# Patient Record
Sex: MALE | Race: WHITE | NOT HISPANIC OR LATINO | Employment: FULL TIME | URBAN - METROPOLITAN AREA
[De-identification: names, ages, dates, MRNs, and addresses within clinical notes are randomized per-mention and may not be internally consistent; named-entity substitution may affect disease eponyms.]

---

## 2023-05-24 ENCOUNTER — OFFICE VISIT (OUTPATIENT)
Dept: PRIMARY CARE | Facility: CLINIC | Age: 53
End: 2023-05-24
Payer: COMMERCIAL

## 2023-05-24 VITALS
BODY MASS INDEX: 29.13 KG/M2 | DIASTOLIC BLOOD PRESSURE: 74 MMHG | HEIGHT: 74 IN | SYSTOLIC BLOOD PRESSURE: 128 MMHG | WEIGHT: 227 LBS

## 2023-05-24 DIAGNOSIS — R53.83 OTHER FATIGUE: Primary | ICD-10-CM

## 2023-05-24 DIAGNOSIS — L91.8 SKIN TAG: ICD-10-CM

## 2023-05-24 DIAGNOSIS — M25.512 LEFT SHOULDER PAIN, UNSPECIFIED CHRONICITY: ICD-10-CM

## 2023-05-24 PROBLEM — R79.89 ELEVATED SERUM CREATININE: Status: ACTIVE | Noted: 2023-05-24

## 2023-05-24 PROBLEM — M75.20 BICEPS TENDONITIS: Status: ACTIVE | Noted: 2023-05-24

## 2023-05-24 PROBLEM — J32.9 SINUSITIS: Status: ACTIVE | Noted: 2023-05-24

## 2023-05-24 PROBLEM — E78.5 DYSLIPIDEMIA: Status: ACTIVE | Noted: 2023-05-24

## 2023-05-24 PROBLEM — R63.1 INCREASED THIRST: Status: ACTIVE | Noted: 2023-05-24

## 2023-05-24 PROBLEM — R19.00 ABDOMINAL LUMP: Status: ACTIVE | Noted: 2023-05-24

## 2023-05-24 PROBLEM — J40 BRONCHITIS: Status: ACTIVE | Noted: 2023-05-24

## 2023-05-24 PROBLEM — J01.90 ACUTE SINUSITIS: Status: ACTIVE | Noted: 2023-05-24

## 2023-05-24 PROBLEM — M25.511 ARTHRALGIA OF RIGHT ACROMIOCLAVICULAR JOINT: Status: ACTIVE | Noted: 2023-05-24

## 2023-05-24 PROBLEM — R06.02 SOB (SHORTNESS OF BREATH): Status: ACTIVE | Noted: 2023-05-24

## 2023-05-24 PROBLEM — R74.8 LIVER ENZYME ELEVATION: Status: ACTIVE | Noted: 2023-05-24

## 2023-05-24 PROBLEM — E78.5 HYPERLIPIDEMIA: Status: ACTIVE | Noted: 2023-05-24

## 2023-05-24 PROBLEM — R73.09 INCREASED GLUCOSE LEVEL: Status: ACTIVE | Noted: 2023-05-24

## 2023-05-24 PROBLEM — H10.30 ACUTE BACTERIAL CONJUNCTIVITIS: Status: ACTIVE | Noted: 2023-05-24

## 2023-05-24 PROBLEM — U07.1 COVID-19: Status: ACTIVE | Noted: 2023-05-24

## 2023-05-24 PROBLEM — R51.9 HEADACHE: Status: ACTIVE | Noted: 2023-05-24

## 2023-05-24 PROBLEM — R35.0 URINARY FREQUENCY: Status: ACTIVE | Noted: 2023-05-24

## 2023-05-24 PROBLEM — G47.00 INSOMNIA: Status: ACTIVE | Noted: 2023-05-24

## 2023-05-24 PROBLEM — G47.33 OBSTRUCTIVE SLEEP APNEA: Status: ACTIVE | Noted: 2023-05-24

## 2023-05-24 PROBLEM — M25.519 SHOULDER PAIN: Status: ACTIVE | Noted: 2023-05-24

## 2023-05-24 PROBLEM — J30.2 SEASONAL ALLERGIES: Status: ACTIVE | Noted: 2023-05-24

## 2023-05-24 PROBLEM — M77.10 TENNIS ELBOW: Status: ACTIVE | Noted: 2023-05-24

## 2023-05-24 PROBLEM — R10.9 ABDOMINAL PAIN: Status: ACTIVE | Noted: 2023-05-24

## 2023-05-24 PROBLEM — H57.9 ITCHY EYES: Status: ACTIVE | Noted: 2023-05-24

## 2023-05-24 PROBLEM — R06.83 SNORING: Status: ACTIVE | Noted: 2023-05-24

## 2023-05-24 PROCEDURE — 99213 OFFICE O/P EST LOW 20 MIN: CPT | Performed by: INTERNAL MEDICINE

## 2023-05-24 PROCEDURE — 1036F TOBACCO NON-USER: CPT | Performed by: INTERNAL MEDICINE

## 2023-06-08 ENCOUNTER — TELEPHONE (OUTPATIENT)
Dept: PRIMARY CARE | Facility: CLINIC | Age: 53
End: 2023-06-08
Payer: COMMERCIAL

## 2023-06-15 NOTE — PROGRESS NOTES
"Subjective   Patient ID: Justus Arredondo is a 53 y.o. male who presents for Follow-up.    HPI   Patient presents with complaints of having left shoulder pain in December he fell on the left side in the bathroom.  He did therapy felt better but now having issues again.  He started going to the gym and doing workouts  Wants skin tags removed from the armpits  Patient feels very tired and fatigued concern if he has low testosterone      for physical  Patient has a sedentary job  He does weightlifting 3 times a week exercising more  eating better  lost weight     Past medical history: High cholesterol, elevated liver function, hypertension  Social history: Nonsmoker, drinks 2 drinks per week  Family history: Grandmother diabetes uncle CVA, pGM CVA      Review of Systems    Objective   /74   Ht 1.88 m (6' 2\")   Wt 103 kg (227 lb)   BMI 29.15 kg/m²     Physical Exam  Vitals reviewed.   Constitutional:       Appearance: Normal appearance.   HENT:      Head: Normocephalic and atraumatic.      Right Ear: Tympanic membrane, ear canal and external ear normal.      Left Ear: Tympanic membrane, ear canal and external ear normal.      Nose: Nose normal.      Mouth/Throat:      Pharynx: Oropharynx is clear.   Eyes:      Extraocular Movements: Extraocular movements intact.      Conjunctiva/sclera: Conjunctivae normal.      Pupils: Pupils are equal, round, and reactive to light.   Cardiovascular:      Rate and Rhythm: Normal rate and regular rhythm.      Pulses: Normal pulses.      Heart sounds: Normal heart sounds.   Pulmonary:      Effort: Pulmonary effort is normal.      Breath sounds: Normal breath sounds.   Abdominal:      General: Abdomen is flat. Bowel sounds are normal.      Palpations: Abdomen is soft.   Musculoskeletal:      Cervical back: Normal range of motion and neck supple.   Skin:     General: Skin is warm and dry.      Comments: Skin tags   Neurological:      General: No focal deficit present.      " Mental Status: He is alert and oriented to person, place, and time.   Psychiatric:         Mood and Affect: Mood normal.         Assessment/Plan   Problem List Items Addressed This Visit          Musculoskeletal    Shoulder pain    Relevant Orders    XR shoulder left 2+ views    MR shoulder left wo IV contrast       Other    Fatigue - Primary    Relevant Orders    Testosterone, total and free    Skin tag    Relevant Orders    Referral to Dermatology     Past recap   physical normal  EKG normal sinus rhythm  Blood work reviewed  Kidney functions are doing better  Liver functions are going up again  Cholesterol is coming down but HDL is going up , TG normal  Continue diet and exercise  Patient does not want to go on medication  Advised to increase activity  Discussed dietary changes  Follow-up in 1 year  Screening colonoscopy    5/24/2023  Left shoulder has slightly restricted range of motion  Worried about it is a capsulitis  MRI of the shoulder ordered  Refer to dermatology for skin tag  Testosterone levels ordered to rule out cause for fatigue

## 2023-06-26 ENCOUNTER — LAB (OUTPATIENT)
Dept: LAB | Facility: LAB | Age: 53
End: 2023-06-26
Payer: COMMERCIAL

## 2023-06-26 DIAGNOSIS — R53.83 OTHER FATIGUE: ICD-10-CM

## 2023-06-26 PROCEDURE — 36415 COLL VENOUS BLD VENIPUNCTURE: CPT

## 2023-06-26 PROCEDURE — 84402 ASSAY OF FREE TESTOSTERONE: CPT

## 2023-06-26 PROCEDURE — 84403 ASSAY OF TOTAL TESTOSTERONE: CPT

## 2023-07-03 LAB
TESTOSTERONE FREE (CHAN): 78.7 PG/ML (ref 35–155)
TESTOSTERONE,TOTAL,LC-MS/MS: 480 NG/DL (ref 250–1100)

## 2023-07-07 DIAGNOSIS — M25.519 SHOULDER PAIN, UNSPECIFIED CHRONICITY, UNSPECIFIED LATERALITY: ICD-10-CM

## 2023-10-11 ENCOUNTER — APPOINTMENT (OUTPATIENT)
Dept: DERMATOLOGY | Facility: CLINIC | Age: 53
End: 2023-10-11
Payer: COMMERCIAL

## 2023-10-11 ENCOUNTER — OFFICE VISIT (OUTPATIENT)
Dept: DERMATOLOGY | Facility: CLINIC | Age: 53
End: 2023-10-11

## 2023-10-11 DIAGNOSIS — L28.0 LICHEN SIMPLEX CHRONICUS: Primary | ICD-10-CM

## 2023-10-11 DIAGNOSIS — L91.8 SKIN TAG: ICD-10-CM

## 2023-10-11 DIAGNOSIS — D18.01 ANGIOMA OF SKIN: ICD-10-CM

## 2023-10-11 PROCEDURE — 1036F TOBACCO NON-USER: CPT | Performed by: NURSE PRACTITIONER

## 2023-10-11 PROCEDURE — 99203 OFFICE O/P NEW LOW 30 MIN: CPT | Performed by: NURSE PRACTITIONER

## 2023-10-11 RX ORDER — DESONIDE 0.5 MG/G
GEL TOPICAL 2 TIMES DAILY
Qty: 60 G | Refills: 3 | Status: SHIPPED | OUTPATIENT
Start: 2023-10-11 | End: 2023-10-25

## 2023-10-11 NOTE — PROGRESS NOTES
Subjective     Justus Arredondo is a 53 y.o. male who presents for the following: Itching (Itching in the groin area. Duration 1 year. Has tried: OTC lotrimin. Itchy 4-5/10. ).     Review of Systems:  No other skin or systemic complaints other than what is documented elsewhere in the note.    The following portions of the chart were reviewed this encounter and updated as appropriate:   Tobacco  Allergies  Meds  Problems  Med Hx  Surg Hx         Skin Cancer History  No skin cancer on file.      Specialty Problems          Dermatology Problems    Skin tag        Objective   Well appearing patient in no apparent distress; mood and affect are within normal limits.    A focused skin examination was performed. All findings within normal limits unless otherwise noted below.    Assessment/Plan   1. Lichen simplex chronicus  Left Thigh - Anterior, Right Thigh - Anterior  Lichenified hyperpigmented plaques    Lichen simplex chronicus (LSC), also known as neurodermatitis circumscripta, is an itchy skin condition causing thickened skin at the areas of skin injured by repeated scratching and rubbing. Lichen simplex chronicus is not a primary disease but rather the skin's response to chronic physical injury (trauma). The gradual thickening of skin, caused by repetitive scratching and rubbing, is called lichenification.    Lichen simplex chronicus begins as itchy skin. The itching leads to scratching and rubbing, which causes thickening of skin. The thickened skin is itchy, which causes more scratching and, thus, more skin thickening. This scratch-itch cycle continues if not treated.    The primary treatment is to stop scratching. However, this can be very difficult once a scratch-itch cycle has started. Areas of lichen simplex chronicus may need to be covered at night, as many people scratch in their sleep.    PLAN  Desonide twice daily for 2-3 weeks then daily for 1 week then every other day for 1 week then stop.    Return to clinic if not improved    desonide (Desonate) 0.05 % gel - Left Thigh - Anterior, Right Thigh - Anterior  Apply topically 2 times a day for 14 days. To affected areas twice daily for 2 weeks  then daily x1 week then every other day x1 week then stop    2. Skin tag (2)  Left Axilla, Right Axilla  Fleshy, skin-colored sessile and pedunculated papules.     A skin tag (acrochordon) is a common condition that appears as a small, soft skin growth, often on a thin stalk. Skin tags are often found on areas of frequent friction, such as the eyelids, neck, underarms, and groin. They are harmless, but they can sometimes become irritated from rubbing on clothing or jewelry, for example.    Skin tags may be removed by a medical professional but the removal is usually not covered by insurance as it is seen as cosmetic, even if the lesions are symptomatic. The patient is seeking removal of skin tags and following discussing paying out of pocket for removal, patient wants to proceed with removal.     Areas were prepped with alcohol. Patient has no allergy to lidocaine reported or dressings. Areas were anesthetized with 1% lidocaine. Scissors were used to remove lesions and then AgNO3 sticks were used to achieve hemostasis. Areas were covered with bandaids. Patient advised to keep dry for 24 hours and change bandages daily until healed. Patient to pay $206.25, cosmetic ticket provided and patient will pay on the way out today.         Related Procedures  Referral to Dermatology    3. Angioma of skin  Scattered cherry-red papule(s).    A cherry hemangioma is a small macule (small, flat, smooth area) or papule (small, solid bump) formed from an overgrowth of tiny blood vessels in the skin. Cherry hemangiomas are characteristically red or purplish in color. They often first appear in middle adulthood and usually increase in number with age. Cherry hemangiomas are noncancerous (benign) and are common in adults.    The  present appearance of the lesion is not worrisome but it should continue to be observed and testing/treatment may be warranted if change occurs.

## 2023-10-26 ENCOUNTER — APPOINTMENT (OUTPATIENT)
Dept: PRIMARY CARE | Facility: CLINIC | Age: 53
End: 2023-10-26
Payer: COMMERCIAL

## 2023-10-27 ENCOUNTER — TELEPHONE (OUTPATIENT)
Dept: PRIMARY CARE | Facility: CLINIC | Age: 53
End: 2023-10-27
Payer: COMMERCIAL

## 2023-10-27 DIAGNOSIS — Z00.00 HEALTHCARE MAINTENANCE: ICD-10-CM

## 2023-10-27 NOTE — TELEPHONE ENCOUNTER
----- Message from Rachelle Rivera MD sent at 10/27/2023  3:38 PM EDT -----  Regarding: RE: labs  Cbc, cmp ,flp , tsh , vit d   ----- Message -----  From: Ceci Scales MA  Sent: 10/27/2023   1:52 PM EDT  To: Rachelle Rivera MD  Subject: labs                                             Patient has appt 11/27, requesting labs ordered prior to appointment.   Please advise.       Addis -  [phone]

## 2023-10-30 ENCOUNTER — APPOINTMENT (OUTPATIENT)
Dept: PRIMARY CARE | Facility: CLINIC | Age: 53
End: 2023-10-30
Payer: COMMERCIAL

## 2023-11-01 ENCOUNTER — LAB (OUTPATIENT)
Dept: LAB | Facility: LAB | Age: 53
End: 2023-11-01
Payer: COMMERCIAL

## 2023-11-01 DIAGNOSIS — Z00.00 HEALTHCARE MAINTENANCE: ICD-10-CM

## 2023-11-01 LAB
25(OH)D3 SERPL-MCNC: 17 NG/ML (ref 30–100)
ALBUMIN SERPL BCP-MCNC: 4.2 G/DL (ref 3.4–5)
ALP SERPL-CCNC: 70 U/L (ref 33–120)
ALT SERPL W P-5'-P-CCNC: 77 U/L (ref 10–52)
ANION GAP SERPL CALC-SCNC: 11 MMOL/L (ref 10–20)
AST SERPL W P-5'-P-CCNC: 42 U/L (ref 9–39)
BILIRUB SERPL-MCNC: 0.7 MG/DL (ref 0–1.2)
BUN SERPL-MCNC: 10 MG/DL (ref 6–23)
CALCIUM SERPL-MCNC: 9.2 MG/DL (ref 8.6–10.3)
CHLORIDE SERPL-SCNC: 106 MMOL/L (ref 98–107)
CHOLEST SERPL-MCNC: 175 MG/DL (ref 0–199)
CHOLESTEROL/HDL RATIO: 5.1
CO2 SERPL-SCNC: 26 MMOL/L (ref 21–32)
CREAT SERPL-MCNC: 1.22 MG/DL (ref 0.5–1.3)
ERYTHROCYTE [DISTWIDTH] IN BLOOD BY AUTOMATED COUNT: 12.8 % (ref 11.5–14.5)
GFR SERPL CREATININE-BSD FRML MDRD: 72 ML/MIN/1.73M*2
GLUCOSE SERPL-MCNC: 90 MG/DL (ref 74–99)
HCT VFR BLD AUTO: 47.7 % (ref 41–52)
HDLC SERPL-MCNC: 34.4 MG/DL
HGB BLD-MCNC: 15.3 G/DL (ref 13.5–17.5)
LDLC SERPL CALC-MCNC: 114 MG/DL
MCH RBC QN AUTO: 28.4 PG (ref 26–34)
MCHC RBC AUTO-ENTMCNC: 32.1 G/DL (ref 32–36)
MCV RBC AUTO: 89 FL (ref 80–100)
NON HDL CHOLESTEROL: 141 MG/DL (ref 0–149)
NRBC BLD-RTO: 0 /100 WBCS (ref 0–0)
PLATELET # BLD AUTO: 217 X10*3/UL (ref 150–450)
POTASSIUM SERPL-SCNC: 4.3 MMOL/L (ref 3.5–5.3)
PROT SERPL-MCNC: 7.1 G/DL (ref 6.4–8.2)
RBC # BLD AUTO: 5.38 X10*6/UL (ref 4.5–5.9)
SODIUM SERPL-SCNC: 139 MMOL/L (ref 136–145)
TRIGL SERPL-MCNC: 135 MG/DL (ref 0–149)
TSH SERPL-ACNC: 1.74 MIU/L (ref 0.44–3.98)
VLDL: 27 MG/DL (ref 0–40)
WBC # BLD AUTO: 6.7 X10*3/UL (ref 4.4–11.3)

## 2023-11-01 PROCEDURE — 82306 VITAMIN D 25 HYDROXY: CPT

## 2023-11-01 PROCEDURE — 80061 LIPID PANEL: CPT

## 2023-11-01 PROCEDURE — 36415 COLL VENOUS BLD VENIPUNCTURE: CPT

## 2023-11-01 PROCEDURE — 84443 ASSAY THYROID STIM HORMONE: CPT

## 2023-11-01 PROCEDURE — 85027 COMPLETE CBC AUTOMATED: CPT

## 2023-11-01 PROCEDURE — 80053 COMPREHEN METABOLIC PANEL: CPT

## 2023-11-27 ENCOUNTER — APPOINTMENT (OUTPATIENT)
Dept: PRIMARY CARE | Facility: CLINIC | Age: 53
End: 2023-11-27
Payer: COMMERCIAL

## 2023-11-29 ENCOUNTER — OFFICE VISIT (OUTPATIENT)
Dept: PRIMARY CARE | Facility: CLINIC | Age: 53
End: 2023-11-29
Payer: COMMERCIAL

## 2023-11-29 VITALS
SYSTOLIC BLOOD PRESSURE: 134 MMHG | BODY MASS INDEX: 29.11 KG/M2 | DIASTOLIC BLOOD PRESSURE: 90 MMHG | WEIGHT: 226.8 LBS | HEIGHT: 74 IN

## 2023-11-29 DIAGNOSIS — Z00.00 HEALTHCARE MAINTENANCE: Primary | ICD-10-CM

## 2023-11-29 DIAGNOSIS — G47.33 OBSTRUCTIVE SLEEP APNEA: ICD-10-CM

## 2023-11-29 DIAGNOSIS — F51.01 PRIMARY INSOMNIA: ICD-10-CM

## 2023-11-29 DIAGNOSIS — M25.512 LEFT SHOULDER PAIN, UNSPECIFIED CHRONICITY: ICD-10-CM

## 2023-11-29 PROCEDURE — 1036F TOBACCO NON-USER: CPT | Performed by: INTERNAL MEDICINE

## 2023-11-29 PROCEDURE — 99396 PREV VISIT EST AGE 40-64: CPT | Performed by: INTERNAL MEDICINE

## 2023-11-29 RX ORDER — ZOLPIDEM TARTRATE 10 MG/1
10 TABLET ORAL NIGHTLY PRN
Qty: 7 TABLET | Refills: 0 | Status: SHIPPED | OUTPATIENT
Start: 2023-11-29 | End: 2024-03-02 | Stop reason: ALTCHOICE

## 2023-11-29 NOTE — PROGRESS NOTES
"Subjective   Patient ID: Justus Arredondo is a 53 y.o. male who presents for Employment Physical.    HPI   For physical   Patient still having some limitation in range of motion on the left shoulder.  He never went for MRI  He is considering having a steroid injection   Patient also has a lot of difficulty sleeping.  Work stress and family stress causes interference with the sleep.  He keeps prescription of Ambien for very rare use   He is using mouthguard to help with the sleep apnea and doing much better with it   He went to see the dermatologist got skin tags removed and irritated keratosis treated with steroid cream    Past recap   patient presents with complaints of having left shoulder pain in December he fell on the left side in the bathroom.  He did therapy felt better but now having issues again.  He started going to the gym and doing workouts  Wants skin tags removed from the armpits  Patient feels very tired and fatigued concern if he has low testosterone      for physical  Patient has a sedentary job  He does weightlifting 3 times a week exercising more  eating better  lost weight     Past medical history: High cholesterol, elevated liver function, hypertension  Social history: Nonsmoker, drinks 2 drinks per week  Family history: Grandmother diabetes uncle CVA, pGM CVA , M DM      Review of Systems    Objective   /90   Ht 1.88 m (6' 2\")   Wt 103 kg (226 lb 12.8 oz)   BMI 29.12 kg/m²     Physical Exam  Vitals reviewed.   Constitutional:       Appearance: Normal appearance.   HENT:      Head: Normocephalic and atraumatic.      Right Ear: Tympanic membrane, ear canal and external ear normal.      Left Ear: Tympanic membrane, ear canal and external ear normal.      Nose: Nose normal.      Mouth/Throat:      Pharynx: Oropharynx is clear.   Eyes:      Extraocular Movements: Extraocular movements intact.      Conjunctiva/sclera: Conjunctivae normal.      Pupils: Pupils are equal, round, and " reactive to light.   Cardiovascular:      Rate and Rhythm: Normal rate and regular rhythm.      Pulses: Normal pulses.      Heart sounds: Normal heart sounds.   Pulmonary:      Effort: Pulmonary effort is normal.      Breath sounds: Normal breath sounds.   Abdominal:      General: Abdomen is flat. Bowel sounds are normal.      Palpations: Abdomen is soft.   Musculoskeletal:      Cervical back: Normal range of motion and neck supple.   Skin:     General: Skin is warm and dry.   Neurological:      General: No focal deficit present.      Mental Status: He is alert and oriented to person, place, and time.   Psychiatric:         Mood and Affect: Mood normal.         Assessment/Plan   Problem List Items Addressed This Visit          Health Encounters    Healthcare maintenance - Primary       Musculoskeletal and Injuries    Shoulder pain    Relevant Orders    Referral to Physical Therapy    MR shoulder left w and wo IV contrast       Sleep    Insomnia    Obstructive sleep apnea    Relevant Medications    zolpidem (Ambien) 10 mg tablet   Past recap   physical normal  EKG normal sinus rhythm  Blood work reviewed  Kidney functions are doing better  Liver functions are going up again  Cholesterol is coming down but HDL is going up , TG normal  Continue diet and exercise  Patient does not want to go on medication  Advised to increase activity  Discussed dietary changes  Follow-up in 1 year  Screening colonoscopy    5/24/2023  Left shoulder has slightly restricted range of motion  Worried about it is a capsulitis  MRI of the shoulder ordered  Refer to dermatology for skin tag  Testosterone levels ordered to rule out cause for fatigue    11/29/2023  Physical normal  Patient had EKG done last year normal   Patient had colonoscopy came out good  Testosterone levels came out normal  Blood work reviewed liver enzymes elevated again  Lifestyle modification discussed  Ambien 7 tablets given  Refer patient to physical therapy  Advised  to do MRI before going for steroid injection as injections are temporary fix  Encourage him to increase hydration

## 2023-12-19 ENCOUNTER — EVALUATION (OUTPATIENT)
Dept: PHYSICAL THERAPY | Facility: CLINIC | Age: 53
End: 2023-12-19
Payer: COMMERCIAL

## 2023-12-19 DIAGNOSIS — M25.512 LEFT SHOULDER PAIN, UNSPECIFIED CHRONICITY: ICD-10-CM

## 2023-12-19 PROCEDURE — 97110 THERAPEUTIC EXERCISES: CPT | Mod: GP

## 2023-12-19 PROCEDURE — 97161 PT EVAL LOW COMPLEX 20 MIN: CPT | Mod: GP

## 2023-12-19 ASSESSMENT — ACTIVITIES OF DAILY LIVING (ADL): ADL_ASSISTANCE: INDEPENDENT

## 2023-12-19 NOTE — PROGRESS NOTES
Physical Therapy    Physical Therapy Evaluation and Treatment      Patient Name: Justus Arredondo  MRN: [id number]  Today's Date: 12/20/2023       Assessment:  PT Assessment  Assessment Comment: Pt presents with chronic L shoulder pain that mainly impairs his ability to reach overhead and exercise at his desired level.  PT tests and measures suggest subscapularis tendinopathy for the RTC insufficiency.  He would benefit from skilled therapy to improve shoulder mobility and painfree motion.     Plan:  OP PT Plan  Treatment/Interventions: Cryotherapy, Dry needling, Education/ Instruction, Electrical stimulation, Manual therapy, Neuromuscular re-education, Taping techniques, Therapeutic activities, Therapeutic exercises    Current Problem:   1. Left shoulder pain, unspecified chronicity  Referral to Physical Therapy    Follow Up In Physical Therapy          Subjective    General:  General  Reason for Referral: L shoulder pain  Referred By: Miguel Wilcox Comment: Pt reports L shoulder pain after sustaining a fall in the bathroom ~ one year ago, reports its about 60% improved, but still has some lingering, preventing full movement of the shoulder, limiting his work outs       Prior Level of Function:  Prior Function Per Pt/Caregiver Report  Level of [place]: Independent with ADLs and functional transfers, Independent with homemaking with ambulation  ADL Assistance: Independent  Homemaking Assistance: Independent  Ambulatory Assistance: Independent  Vocational: Full time employment    Objective     Cervical AROM  Cervical AROM WFL: yes    Functional Rating Scale  Quick Dash: 25% self reported disability  Observation  Cervical Posture: forward posture  Shoulder palpation/Joint Assessment   Pain in axillary at sub scap insertion  Cervical AROM  Cervical AROM WFL: yes  Shoulder AROM  R Shoulder flexion: (180°): 180  L Shoulder flexion: (180°): 180  R Shoulder Extension: (60°): 60  L Shoulder Extension: (60°): 60  R  shoulder abduction: (180°): 180  L Shoulder abduction: (180°): 175  R Shoulder ER: (90°): T2  L shoulder ER: (90°): C8  R shoulder IR: (70°): T12  L shoulder IR: (70°): L3  Shoulder PROM  L shoulder abduction: (180°): 175  R shoulder ER: (90°): 90  L shoulder ER: (90°): 80* with pain  R shoulder IR: (70°): 90  L shoulder IR: (70°): 80  Cervical Myotomes (MMT  R Scapular Elevation (C4 ): (5/5): 5/5  L Scapular Elevation (C4 ): (5/5): 4+/5  Shoulder Strength  L shoulder IR: (5/5): 3+/5    Shoulder Special  Painful arc: Negative: pos  UE Special Tests Comments: lift off test LUE    Treatments:  Therapeutic Exercise  Therapeutic Exercise Activity 1: L shoudler IR x30 greent TB  Therapeutic Exercise Activity 2: L shoulder iso holds with walk outs blue TB  Therapeutic Exercise Activity 3: L shoulder chop green TB x20    EDUCATION:  Outpatient Education  Individual(s) Educated: Patient  Education Provided: POC, Home Exercise Program, Body Mechanics    Goals:  Active       PT Problem       demo HEP w/ at least 75% accuracy in order to increase strength and flexibility        Start:  12/20/23    Expected End:  03/12/24            Pt will improve QuickDASH score by 10% to demonstrate in order to show increased functional mobility        Start:  12/20/23    Expected End:  03/12/24            demonstrate 2/3 a muscle grade strength increase in LUE in order to complete his home exercise routine         Start:  12/20/23    Expected End:  03/12/24            increased ROM of L shoulder to full range degrees in order to improve reaching overhead without pain        Start:  12/20/23    Expected End:  03/12/24            report GROC +3 or greater (MCID) in order to show self perceived improvement with therapy        Start:  12/20/23    Expected End:  03/12/24                Access Code: [email or web address]  URL: [email or web address]  Date: 12/19/2023  Prepared by: Kyle Batres    Exercises  - Shoulder Internal  Rotation with Resistance  - 1 x daily - 7 x weekly - 3 sets - 20-30 reps  - Shoulder Internal Rotation Reactive Isometrics  - 1 x daily - 7 x weekly - 3 sets - 20-30 reps  - Standing Diagonal Chop  - 1 x daily - 7 x weekly - 3 sets - 20-30 reps

## 2023-12-19 NOTE — Clinical Note
December 20, 2023    Kyle Batres, PT  [address] [facility]  [facility], [address]  [place] [address]    Patient: Justus Arredondo   YOB: 1970   Date of Visit: 12/19/2023       Dear Rachelle Rivera Md  [address] [place] [address]  [facility], [address]  [place] [address]    The attached plan of care is being sent to you because your patient’s medical reimbursement requires that you certify the plan of care. Your signature is required to allow uninterrupted insurance coverage.      You may indicate your approval by signing below and faxing this form back to us at Dept Fax: [phone].    Please call Dept: [phone] with any questions or concerns.    Thank you for this referral,        Kyle Batres, PT  [facility] [address] [facility]  [address] [facility] [address]    Payer: Payor: GENERIC COMMERCIAL / Plan: GENERIC COMMERCIAL / Product Type: *No Product type* /                                                                         Date:     Dear Kyle Batres, PT,     Re: Mr. Justus Arredondo, MRN:[id number]    I certify that I have reviewed the attached plan of care and it is medically necessary for Mr. Justus Arredondo (1970) who is under my care.          ______________________________________                    _________________  Provider name and credentials                                           Date and time                                                                                           Plan of Care 12/19/23   Effective from: 12/19/2023  Effective to: 3/12/2024    Active  Plan ID: [id number]           Participants as of 12/20/2023    Name Type Comments Contact Info    Rachelle Rivera MD PCP - General  [phone]    Kyle Batres PT Physical Therapist  [phone]      Last Plan Note     Author: Kyle Batres PT Status: Signed Last edited: 12/19/2023 11:45 AM       Physical Therapy    Physical Therapy Evaluation and  Treatment      Patient Name: Justus Arredondo  MRN: [id number]  Today's Date: 12/20/2023       Assessment:  PT Assessment  Assessment Comment: Pt presents with chronic L shoulder pain that mainly impairs his ability to reach overhead and exercise at his desired level.  PT tests and measures suggest subscapularis tendinopathy for the RTC insufficiency.  He would benefit from skilled therapy to improve shoulder mobility and painfree motion.     Plan:  OP PT Plan  Treatment/Interventions: Cryotherapy, Dry needling, Education/ Instruction, Electrical stimulation, Manual therapy, Neuromuscular re-education, Taping techniques, Therapeutic activities, Therapeutic exercises    Current Problem:   1. Left shoulder pain, unspecified chronicity  Referral to Physical Therapy    Follow Up In Physical Therapy          Subjective    General:  General  Reason for Referral: L shoulder pain  Referred By: Miguel Wilcox Comment: Pt reports L shoulder pain after sustaining a fall in the bathroom ~ one year ago, reports its about 60% improved, but still has some lingering, preventing full movement of the shoulder, limiting his work outs       Prior Level of Function:  Prior Function Per Pt/Caregiver Report  Level of [place]: Independent with ADLs and functional transfers, Independent with homemaking with ambulation  ADL Assistance: Independent  Homemaking Assistance: Independent  Ambulatory Assistance: Independent  Vocational: Full time employment    Objective     Cervical AROM  Cervical AROM WFL: yes    Functional Rating Scale  Quick Dash: 25% self reported disability  Observation  Cervical Posture: forward posture  Shoulder palpation/Joint Assessment   Pain in axillary at sub scap insertion  Cervical AROM  Cervical AROM WFL: yes  Shoulder AROM  R Shoulder flexion: (180°): 180  L Shoulder flexion: (180°): 180  R Shoulder Extension: (60°): 60  L Shoulder Extension: (60°): 60  R shoulder abduction: (180°): 180  L Shoulder  abduction: (180°): 175  R Shoulder ER: (90°): T2  L shoulder ER: (90°): C8  R shoulder IR: (70°): T12  L shoulder IR: (70°): L3  Shoulder PROM  L shoulder abduction: (180°): 175  R shoulder ER: (90°): 90  L shoulder ER: (90°): 80* with pain  R shoulder IR: (70°): 90  L shoulder IR: (70°): 80  Cervical Myotomes (MMT  R Scapular Elevation (C4 ): (5/5): 5/5  L Scapular Elevation (C4 ): (5/5): 4+/5  Shoulder Strength  L shoulder IR: (5/5): 3+/5    Shoulder Special  Painful arc: Negative: pos  UE Special Tests Comments: lift off test LUE    Treatments:  Therapeutic Exercise  Therapeutic Exercise Activity 1: L shoudler IR x30 greent TB  Therapeutic Exercise Activity 2: L shoulder iso holds with walk outs blue TB  Therapeutic Exercise Activity 3: L shoulder chop green TB x20    EDUCATION:  Outpatient Education  Individual(s) Educated: Patient  Education Provided: POC, Home Exercise Program, Body Mechanics    Goals:  Active       PT Problem       demo HEP w/ at least 75% accuracy in order to increase strength and flexibility        Start:  12/20/23    Expected End:  03/12/24            Pt will improve QuickDASH score by 10% to demonstrate in order to show increased functional mobility        Start:  12/20/23    Expected End:  03/12/24            demonstrate 2/3 a muscle grade strength increase in LUE in order to complete his home exercise routine         Start:  12/20/23    Expected End:  03/12/24            increased ROM of L shoulder to full range degrees in order to improve reaching overhead without pain        Start:  12/20/23    Expected End:  03/12/24            report GROC +3 or greater (MCID) in order to show self perceived improvement with therapy        Start:  12/20/23    Expected End:  03/12/24                Access Code: [email or web address]  URL: [email or web address]  Date: 12/19/2023  Prepared by: Kyle Batres    Exercises  - Shoulder Internal Rotation with Resistance  - 1 x daily - 7 x weekly  - 3 sets - 20-30 reps  - Shoulder Internal Rotation Reactive Isometrics  - 1 x daily - 7 x weekly - 3 sets - 20-30 reps  - Standing Diagonal Chop  - 1 x daily - 7 x weekly - 3 sets - 20-30 reps

## 2023-12-26 ENCOUNTER — HOSPITAL ENCOUNTER (OUTPATIENT)
Dept: RADIOLOGY | Facility: HOSPITAL | Age: 53
Discharge: HOME | End: 2023-12-26
Payer: COMMERCIAL

## 2023-12-26 DIAGNOSIS — M25.512 LEFT SHOULDER PAIN, UNSPECIFIED CHRONICITY: ICD-10-CM

## 2023-12-26 PROCEDURE — 73221 MRI JOINT UPR EXTREM W/O DYE: CPT | Mod: LT

## 2023-12-29 ENCOUNTER — APPOINTMENT (OUTPATIENT)
Dept: PHYSICAL THERAPY | Facility: CLINIC | Age: 53
End: 2023-12-29
Payer: COMMERCIAL

## 2024-01-03 ENCOUNTER — OFFICE VISIT (OUTPATIENT)
Dept: PRIMARY CARE | Facility: CLINIC | Age: 54
End: 2024-01-03
Payer: COMMERCIAL

## 2024-01-03 VITALS
SYSTOLIC BLOOD PRESSURE: 128 MMHG | BODY MASS INDEX: 30.09 KG/M2 | WEIGHT: 227 LBS | DIASTOLIC BLOOD PRESSURE: 70 MMHG | HEIGHT: 73 IN

## 2024-01-03 DIAGNOSIS — M75.02 ADHESIVE CAPSULITIS OF LEFT SHOULDER: Primary | ICD-10-CM

## 2024-01-03 DIAGNOSIS — M25.512 LEFT SHOULDER PAIN, UNSPECIFIED CHRONICITY: ICD-10-CM

## 2024-01-03 PROCEDURE — 99213 OFFICE O/P EST LOW 20 MIN: CPT | Performed by: INTERNAL MEDICINE

## 2024-01-03 PROCEDURE — 1036F TOBACCO NON-USER: CPT | Performed by: INTERNAL MEDICINE

## 2024-01-03 RX ORDER — NABUMETONE 750 MG/1
750 TABLET, FILM COATED ORAL 2 TIMES DAILY
Qty: 60 TABLET | Refills: 0 | Status: SHIPPED | OUTPATIENT
Start: 2024-01-03 | End: 2024-02-02

## 2024-01-03 ASSESSMENT — ENCOUNTER SYMPTOMS
LOSS OF SENSATION IN FEET: 0
OCCASIONAL FEELINGS OF UNSTEADINESS: 0
DEPRESSION: 0

## 2024-01-03 NOTE — PROGRESS NOTES
"Subjective   Patient ID: Justus Arredondo is a 53 y.o. male who presents for Follow-up.    HPI   Patient is here for follow-up on MRI of the left shoulder  He is dealing with this pain for last 1 year since injury  Pain has improved but recently he was doing more workout in the gym and for past 1 month the pain has got worse.  It hurts to move the arm and the back and legs above the head     Past recap   for physical   Patient still having some limitation in range of motion on the left shoulder.  He never went for MRI  He is considering having a steroid injection   Patient also has a lot of difficulty sleeping.  Work stress and family stress causes interference with the sleep.  He keeps prescription of Ambien for very rare use   He is using mouthguard to help with the sleep apnea and doing much better with it   He went to see the dermatologist got skin tags removed and irritated keratosis treated with steroid cream    patient presents with complaints of having left shoulder pain in December he fell on the left side in the bathroom.  He did therapy felt better but now having issues again.  He started going to the gym and doing workouts  Wants skin tags removed from the armpits  Patient feels very tired and fatigued concern if he has low testosterone      for physical  Patient has a sedentary job  He does weightlifting 3 times a week exercising more  eating better  lost weight     Past medical history: High cholesterol, elevated liver function, hypertension  Social history: Nonsmoker, drinks 2 drinks per week  Family history: Grandmother diabetes uncle CVA, pGM CVA , M DM      Review of Systems    Objective   /70   Ht 1.854 m (6' 1\")   Wt 103 kg (227 lb)   BMI 29.95 kg/m²     Physical Exam  Vitals reviewed.   Constitutional:       Appearance: Normal appearance.   HENT:      Head: Normocephalic and atraumatic.      Right Ear: Tympanic membrane, ear canal and external ear normal.      Left Ear: Tympanic " membrane, ear canal and external ear normal.      Nose: Nose normal.      Mouth/Throat:      Pharynx: Oropharynx is clear.   Eyes:      Extraocular Movements: Extraocular movements intact.      Conjunctiva/sclera: Conjunctivae normal.      Pupils: Pupils are equal, round, and reactive to light.   Cardiovascular:      Rate and Rhythm: Normal rate and regular rhythm.      Pulses: Normal pulses.      Heart sounds: Normal heart sounds.   Pulmonary:      Effort: Pulmonary effort is normal.      Breath sounds: Normal breath sounds.   Abdominal:      General: Abdomen is flat. Bowel sounds are normal.      Palpations: Abdomen is soft.   Musculoskeletal:      Cervical back: Normal range of motion and neck supple.   Skin:     General: Skin is warm and dry.   Neurological:      General: No focal deficit present.      Mental Status: He is alert and oriented to person, place, and time.   Psychiatric:         Mood and Affect: Mood normal.         Assessment/Plan   Problem List Items Addressed This Visit          Musculoskeletal and Injuries    Shoulder pain    Relevant Medications    nabumetone (Relafen) 750 mg tablet    Other Relevant Orders    Referral to Orthopaedic Surgery   Past recap   physical normal  EKG normal sinus rhythm  Blood work reviewed  Kidney functions are doing better  Liver functions are going up again  Cholesterol is coming down but HDL is going up , TG normal  Continue diet and exercise  Patient does not want to go on medication  Advised to increase activity  Discussed dietary changes  Follow-up in 1 year  Screening colonoscopy    5/24/2023  Left shoulder has slightly restricted range of motion  Worried about it is a capsulitis  MRI of the shoulder ordered  Refer to dermatology for skin tag  Testosterone levels ordered to rule out cause for fatigue    11/29/2023  Physical normal  Patient had EKG done last year normal   Patient had colonoscopy came out good  Testosterone levels came out normal  Blood work  reviewed liver enzymes elevated again  Lifestyle modification discussed  Ambien 7 tablets given  Refer patient to physical therapy  Advised to do MRI before going for steroid injection as injections are temporary fix  Encourage him to increase hydration    1/3/2024  MRI of left shoulder results reviewed  Concern for adhesive capsulitis  Moderate supraspinatus tendinitis with low-grade partial intrasubstance tear  Patient should try physical therapy as it does not look like surgical case  If that does not help steroid injection will help  Refer patient to physical therapy  Refer patient to orthopedic  Will give with a course of anti-inflammatory  Follow-up if not

## 2024-01-04 ENCOUNTER — APPOINTMENT (OUTPATIENT)
Dept: PHYSICAL THERAPY | Facility: CLINIC | Age: 54
End: 2024-01-04
Payer: COMMERCIAL

## 2024-01-15 ENCOUNTER — APPOINTMENT (OUTPATIENT)
Dept: PHYSICAL THERAPY | Facility: CLINIC | Age: 54
End: 2024-01-15
Payer: COMMERCIAL

## 2024-01-23 ENCOUNTER — APPOINTMENT (OUTPATIENT)
Dept: PHYSICAL THERAPY | Facility: CLINIC | Age: 54
End: 2024-01-23
Payer: COMMERCIAL

## 2024-01-30 ENCOUNTER — TREATMENT (OUTPATIENT)
Dept: PHYSICAL THERAPY | Facility: CLINIC | Age: 54
End: 2024-01-30
Payer: COMMERCIAL

## 2024-01-30 DIAGNOSIS — M25.512 LEFT SHOULDER PAIN, UNSPECIFIED CHRONICITY: ICD-10-CM

## 2024-01-30 PROCEDURE — 97530 THERAPEUTIC ACTIVITIES: CPT | Mod: GP

## 2024-01-30 PROCEDURE — 97110 THERAPEUTIC EXERCISES: CPT | Mod: GP

## 2024-01-30 NOTE — PROGRESS NOTES
Physical Therapy    Physical Therapy Treatment/Recheck    Patient Name: Justus Arredondo  MRN: [id number]  Today's Date: 1/30/2024  Time Calculation  Start Time: 0755  Stop Time: 0836  Time Calculation (min): 41 min      Assessment:   Pt appears to have lost some AROM in the L shoulder since last visit, no increased pain but quickly fatigues with exercises, demonstrated understanding for pain free range with HEP  Plan:   Continue 1x/wk for 6-8 weeks    Current Problem  1. Left shoulder pain, unspecified chronicity  Follow Up In Physical Therapy          General     General  Reason for Referral: L shoulder pain  Referred By: Miguel  General Comment: pt states    Subjective      Pain   0/10    Objective      Shoulder AROM  L Shoulder flexion: (180°): 170  R Shoulder Extension: (60°): .  L Shoulder abduction: (180°): 170  L shoulder ER: (90°): C8  L shoulder IR: (70°): L3  Shoulder PROM  L shoulder flexion: (180°): 180  L shoulder ER: (90°): 80  L shoulder IR: (70°): 80    Shoulder Strength  R shoulder flexion: (5/5): 5/5  L shoulder flexion: (5/5): 4/5  R shoulder abduction: (5/5): 5/5  L shoulder abduction: (5/5): 4/5  R shoulder ER: (5/5): 5/5  L shoulder ER: (5/5): 4+/5  R shoulder IR: (5/5) : 5/5  L shoulder IR: (5/5): 4-/5     Shoulder Special  Painful arc: Negative: pos  Infraspinatus MMT: Negative: neg  Neer: (Negative): neg  UE Special Tests Comments: lift off IR neg, full can pos L,      Treatments:  Therapeutic Exercise  Therapeutic Exercise Activity 1: L shoudler IR 2x30 green TB  Therapeutic Exercise Activity 2: L shoulder iso holds with walk outs blue TB x30  Therapeutic Exercise Activity 3: LUE D1 flexion green TB 2x15  Therapeutic Exercise Activity 4: L shoulder abduction to ~75 degrees 1# 3x30  Therapeutic Exercise Activity 5: scapular retraction with b/l shoulder ER 3x20 red TB  Therapeutic Exercise Activity 6: prone scapular retraction with shoulder at 90 relaxed x30   Therapeutic Exercise Activity  7: L shoulder pendulums for pain relief     OP EDUCATION:   Discussed POC, pain free motion with exercises       Goals:  Active       PT Problem       demo HEP w/ at least 75% accuracy in order to increase strength and flexibility  (Progressing)       Start:  12/20/23    Expected End:  03/12/24            Pt will improve QuickDASH score by 10% to demonstrate in order to show increased functional mobility  (Progressing)       Start:  12/20/23    Expected End:  03/12/24            demonstrate 2/3 a muscle grade strength increase in LUE in order to complete his home exercise routine   (Progressing)       Start:  12/20/23    Expected End:  03/12/24            increased ROM of L shoulder to full range degrees in order to improve reaching overhead without pain  (Progressing)       Start:  12/20/23    Expected End:  03/12/24            report GROC +3 or greater (MCID) in order to show self perceived improvement with therapy  (Progressing)       Start:  12/20/23    Expected End:  03/12/24

## 2024-02-16 ENCOUNTER — APPOINTMENT (OUTPATIENT)
Dept: PHYSICAL THERAPY | Facility: CLINIC | Age: 54
End: 2024-02-16
Payer: COMMERCIAL

## 2024-02-19 ENCOUNTER — TELEMEDICINE (OUTPATIENT)
Dept: PRIMARY CARE | Facility: CLINIC | Age: 54
End: 2024-02-19
Payer: COMMERCIAL

## 2024-02-19 ENCOUNTER — TELEPHONE (OUTPATIENT)
Dept: PRIMARY CARE | Facility: CLINIC | Age: 54
End: 2024-02-19

## 2024-02-19 DIAGNOSIS — R05.1 ACUTE COUGH: ICD-10-CM

## 2024-02-19 PROCEDURE — 99213 OFFICE O/P EST LOW 20 MIN: CPT | Performed by: INTERNAL MEDICINE

## 2024-02-19 PROCEDURE — 1036F TOBACCO NON-USER: CPT | Performed by: INTERNAL MEDICINE

## 2024-02-19 RX ORDER — BENZONATATE 200 MG/1
200 CAPSULE ORAL 3 TIMES DAILY PRN
Qty: 60 CAPSULE | Refills: 0 | Status: SHIPPED | OUTPATIENT
Start: 2024-02-19 | End: 2024-08-17

## 2024-02-19 RX ORDER — AZITHROMYCIN 500 MG/1
500 TABLET, FILM COATED ORAL DAILY
Qty: 10 TABLET | Refills: 0 | Status: SHIPPED | OUTPATIENT
Start: 2024-02-19 | End: 2024-02-29

## 2024-02-19 NOTE — TELEPHONE ENCOUNTER
"Pt's asist \"Addis\" called to schedule an appt for pt. Pt was advised at last office visit on 1/3/24 that HIPAA form needs to be filled out with pt's assist. Addis called today stated that HIPAA for is sitting on her desk and needs to be faxed. HIPAA form does not resemble any signatures for previous and was in assistant's writing. Will speak with  anyi or norman regarding this matter  "

## 2024-02-23 ENCOUNTER — TREATMENT (OUTPATIENT)
Dept: PHYSICAL THERAPY | Facility: CLINIC | Age: 54
End: 2024-02-23
Payer: COMMERCIAL

## 2024-02-23 DIAGNOSIS — M25.512 LEFT SHOULDER PAIN, UNSPECIFIED CHRONICITY: ICD-10-CM

## 2024-02-23 PROCEDURE — 97110 THERAPEUTIC EXERCISES: CPT | Mod: GP

## 2024-02-23 ASSESSMENT — PAIN SCALES - GENERAL: PAINLEVEL_OUTOF10: 2

## 2024-02-23 ASSESSMENT — PAIN - FUNCTIONAL ASSESSMENT: PAIN_FUNCTIONAL_ASSESSMENT: 0-10

## 2024-02-23 NOTE — PROGRESS NOTES
Physical Therapy  Physical Therapy Treatment    Patient Name: Justus Arredondo  MRN: [id number]  Today's Date: 2/23/2024  Time Calculation  Start Time: 0803  Stop Time: 0836  Time Calculation (min): 33 min    Insurance:  Visit number: 3 of 12      General  Chief Complaint:   1. Left shoulder pain, unspecified chronicity  Follow Up In Physical Therapy          Subjective:     Current Problem  General  Reason for Referral: L shoulder pain  Referred By: Miguel Wilcox Comment: pt states he feel 30% improvement with his shoulder, but has been sidelined with the flu lately    Precautions: none       Performing HEP?: Yes    Pain  Pain Assessment: 0-10  Pain Score: 2  Pain Type: Acute pain  Pain Location: Shoulder  Pain Orientation: Left      Objective:       Treatments:   This therapist instructed and demonstrated interventions to patient, patient completed the following under direct supervision of this therapist:  Therapeutic Exercise:   min  33 MINUTES  Therapeutic Exercise  Therapeutic Exercise Activity 1: L shoudler IR 2x30 blue TB  Therapeutic Exercise Activity 2: LUE D2 flexion with cane  Therapeutic Exercise Activity 3: L shoulder abduction to ~75 degrees 2# 2x30  Therapeutic Exercise Activity 4: L shoulder abduction to ~90 degrees 2# 2x30  Therapeutic Exercise Activity 5: ball circles CW/CCW x2  Therapeutic Exercise Activity 6: L shoulder iso holds with walk outs double green tube  Therapeutic Exercise Activity 7: prone scapular retraction with shoulder at 90 relaxed x30  Therapeutic Exercise Activity 8: prone scapular retraction with shoulder at 90 relaxed x30  Therapeutic Exercise Activity 9: supine serratus punches 3#  Therapeutic Exercise Activity 10: supine shoulder ABCs      Assessment:  PT Assessment  Assessment Comment: pt continues able to produce more reps at higher resistance    Plan:  Continue progressing high level shoulder exercises as tolerated       Active       PT Problem       demo HEP w/  at least 75% accuracy in order to increase strength and flexibility  (Progressing)       Start:  12/20/23    Expected End:  03/12/24            Pt will improve QuickDASH score by 10% to demonstrate in order to show increased functional mobility  (Progressing)       Start:  12/20/23    Expected End:  03/12/24            demonstrate 2/3 a muscle grade strength increase in LUE in order to complete his home exercise routine   (Progressing)       Start:  12/20/23    Expected End:  03/12/24            increased ROM of L shoulder to full range degrees in order to improve reaching overhead without pain  (Progressing)       Start:  12/20/23    Expected End:  03/12/24            report GROC +3 or greater (MCID) in order to show self perceived improvement with therapy  (Progressing)       Start:  12/20/23    Expected End:  03/12/24                Education:   Discussed length of POC to desired level      Kyle Batres, PT

## 2024-02-26 ENCOUNTER — OFFICE VISIT (OUTPATIENT)
Dept: PRIMARY CARE | Facility: CLINIC | Age: 54
End: 2024-02-26
Payer: COMMERCIAL

## 2024-02-26 VITALS
BODY MASS INDEX: 29.79 KG/M2 | DIASTOLIC BLOOD PRESSURE: 70 MMHG | HEIGHT: 73 IN | WEIGHT: 224.8 LBS | SYSTOLIC BLOOD PRESSURE: 100 MMHG

## 2024-02-26 DIAGNOSIS — R05.1 ACUTE COUGH: ICD-10-CM

## 2024-02-26 PROCEDURE — 1036F TOBACCO NON-USER: CPT | Performed by: INTERNAL MEDICINE

## 2024-02-26 PROCEDURE — 99213 OFFICE O/P EST LOW 20 MIN: CPT | Performed by: INTERNAL MEDICINE

## 2024-02-26 RX ORDER — ALBUTEROL SULFATE 90 UG/1
2 AEROSOL, METERED RESPIRATORY (INHALATION) EVERY 4 HOURS PRN
Qty: 8.5 G | Refills: 1 | Status: SHIPPED | OUTPATIENT
Start: 2024-02-26 | End: 2025-02-25

## 2024-02-26 NOTE — PROGRESS NOTES
"Subjective   Patient ID: Justus Arredondo is a 53 y.o. male who presents for Sinus Problem and Cough.    HPI   Patient is here for complaints of having sinus congestion which is improving but the cough is not going away its deep and harsh sometimes brings up clear phlegm.  Had COVID 3 weeks ago     Past recap   patient is here for follow-up on MRI of the left shoulder  He is dealing with this pain for last 1 year since injury  Pain has improved but recently he was doing more workout in the gym and for past 1 month the pain has got worse.  It hurts to move the arm and the back and legs above the head     for physical   Patient still having some limitation in range of motion on the left shoulder.  He never went for MRI  He is considering having a steroid injection   Patient also has a lot of difficulty sleeping.  Work stress and family stress causes interference with the sleep.  He keeps prescription of Ambien for very rare use   He is using mouthguard to help with the sleep apnea and doing much better with it   He went to see the dermatologist got skin tags removed and irritated keratosis treated with steroid cream    patient presents with complaints of having left shoulder pain in December he fell on the left side in the bathroom.  He did therapy felt better but now having issues again.  He started going to the gym and doing workouts  Wants skin tags removed from the armpits  Patient feels very tired and fatigued concern if he has low testosterone      for physical  Patient has a sedentary job  He does weightlifting 3 times a week exercising more  eating better  lost weight     Past medical history: High cholesterol, elevated liver function, hypertension  Social history: Nonsmoker, drinks 2 drinks per week  Family history: Grandmother diabetes uncle CVA, pGM CVA , M DM      Review of Systems    Objective   /70   Ht 1.854 m (6' 1\")   Wt 102 kg (224 lb 12.8 oz)   BMI 29.66 kg/m²       Assessment/Plan "   Problem List Items Addressed This Visit    None  Visit Diagnoses       Acute cough        Relevant Medications    albuterol (ProAir HFA) 90 mcg/actuation inhaler        Past recap   physical normal  EKG normal sinus rhythm  Blood work reviewed  Kidney functions are doing better  Liver functions are going up again  Cholesterol is coming down but HDL is going up , TG normal  Continue diet and exercise  Patient does not want to go on medication  Advised to increase activity  Discussed dietary changes  Follow-up in 1 year  Screening colonoscopy    5/24/2023  Left shoulder has slightly restricted range of motion  Worried about it is a capsulitis  MRI of the shoulder ordered  Refer to dermatology for skin tag  Testosterone levels ordered to rule out cause for fatigue    11/29/2023  Physical normal  Patient had EKG done last year normal   Patient had colonoscopy came out good  Testosterone levels came out normal  Blood work reviewed liver enzymes elevated again  Lifestyle modification discussed  Ambien 7 tablets given  Refer patient to physical therapy  Advised to do MRI before going for steroid injection as injections are temporary fix  Encourage him to increase hydration    1/3/2024  MRI of left shoulder results reviewed  Concern for adhesive capsulitis  Moderate supraspinatus tendinitis with low-grade partial intrasubstance tear  Patient should try physical therapy as it does not look like surgical case  If that does not help steroid injection will help  Refer patient to physical therapy  Refer patient to orthopedic  Will give with a course of anti-inflammatory  Follow-up if not    2/26/2024  Explained patient most likely have post reactive airway disease causing cough  Treatment is just cough suppressants unless patient is wheezing which is not  Albuterol inhaler as needed

## 2024-03-01 ENCOUNTER — TREATMENT (OUTPATIENT)
Dept: PHYSICAL THERAPY | Facility: CLINIC | Age: 54
End: 2024-03-01
Payer: COMMERCIAL

## 2024-03-01 DIAGNOSIS — M25.512 LEFT SHOULDER PAIN, UNSPECIFIED CHRONICITY: ICD-10-CM

## 2024-03-01 PROCEDURE — 97110 THERAPEUTIC EXERCISES: CPT | Mod: GP

## 2024-03-01 PROCEDURE — 97140 MANUAL THERAPY 1/> REGIONS: CPT | Mod: GP

## 2024-03-01 ASSESSMENT — PAIN - FUNCTIONAL ASSESSMENT: PAIN_FUNCTIONAL_ASSESSMENT: 0-10

## 2024-03-01 ASSESSMENT — PAIN SCALES - GENERAL: PAINLEVEL_OUTOF10: 0 - NO PAIN

## 2024-03-01 NOTE — PROGRESS NOTES
Physical Therapy  Physical Therapy Treatment    Patient Name: Justus Arredondo  MRN: [id number]  Today's Date: 3/1/2024  Time Calculation  Start Time: 0759  Stop Time: 0837  Time Calculation (min): 38 min    Insurance:  Visit number: 4 of 12      General  Chief Complaint:   1. Left shoulder pain, unspecified chronicity  Follow Up In Physical Therapy          Subjective:     Current Problem  General  Reason for Referral: L shoulder pain  Referred By: Miguel  General Comment: pt he states his pain is almost gone until he reaches behind his back and up.  he isn't having problems with sleeping anymore    Precautions: none       Performing HEP?: Yes    Pain  Pain Assessment: 0-10  Pain Score: 0 - No pain      Objective:       Treatments:   This therapist instructed and demonstrated interventions to patient, patient completed the following under direct supervision of this therapist:  Therapeutic Exercise:   min  28 MINUTES  Therapeutic Exercise  Therapeutic Exercise Activity 1: shoulder pulleys flexion, scaption, abduction x2 mins each  Therapeutic Exercise Activity 2: L shoulder abduction to ~90 degrees 2# 2x30  Therapeutic Exercise Activity 3: LUE D2 flexion with cane 1# ~80 deg  Therapeutic Exercise Activity 4: L shoulder ISO holds with walk outs double green tube 3x~6-7  Therapeutic Exercise Activity 5: ball circles CW/CCW with 2kg weighted ball x2  Therapeutic Exercise Activity 6: sidelying L shoulder ER 3# x30  Therapeutic Exercise Activity 7: sidelying L shoulder IR 3# x30  Therapeutic Exercise Activity 8: prone scapular retraction with shoulder at 90 relaxed x15  Therapeutic Exercise Activity 9: supine serratus punches 3# 2x30  Therapeutic Exercise Activity 10: seated scapular retraction with b/l shoulder ER yellow TB x20    Manual Therapy:       10 MINUTES  Manual Therapy  Manual Therapy Activity 1: axial traction of L humerus with grade 1 osscilations  Manual Therapy Activity 2: AP mobs o GHJ x2 grade 1-2  for pain relief  Manual Therapy Activity 3: attempted AP mob corss body through distal humerus, ceased due to increased pain    Assessment:  PT Assessment  Assessment Comment: pt able to produce high reps with al exercises, he did not tolerate shoulder mobs through the humerus this date but no problems when applied proximally, no pain with exercises    Plan:  Continue with shoulder stability and strengthening with emphasis on subscapularis        Active       PT Problem       demo HEP w/ at least 75% accuracy in order to increase strength and flexibility  (Progressing)       Start:  12/20/23    Expected End:  03/12/24            Pt will improve QuickDASH score by 10% to demonstrate in order to show increased functional mobility  (Progressing)       Start:  12/20/23    Expected End:  03/12/24            demonstrate 2/3 a muscle grade strength increase in LUE in order to complete his home exercise routine   (Progressing)       Start:  12/20/23    Expected End:  03/12/24            increased ROM of L shoulder to full range degrees in order to improve reaching overhead without pain  (Progressing)       Start:  12/20/23    Expected End:  03/12/24            report GROC +3 or greater (MCID) in order to show self perceived improvement with therapy  (Progressing)       Start:  12/20/23    Expected End:  03/12/24                Education:   Discussed home exercises positions for weight lifting      Kyle Batres, PT   yes...

## 2024-03-04 ENCOUNTER — TREATMENT (OUTPATIENT)
Dept: PHYSICAL THERAPY | Facility: CLINIC | Age: 54
End: 2024-03-04
Payer: COMMERCIAL

## 2024-03-04 DIAGNOSIS — M25.512 LEFT SHOULDER PAIN, UNSPECIFIED CHRONICITY: ICD-10-CM

## 2024-03-04 PROCEDURE — 97110 THERAPEUTIC EXERCISES: CPT | Mod: GP

## 2024-03-04 ASSESSMENT — PAIN - FUNCTIONAL ASSESSMENT: PAIN_FUNCTIONAL_ASSESSMENT: 0-10

## 2024-03-04 ASSESSMENT — PAIN SCALES - GENERAL: PAINLEVEL_OUTOF10: 0 - NO PAIN

## 2024-03-04 NOTE — PROGRESS NOTES
Physical Therapy  Physical Therapy Treatment    Patient Name: Justus Arredondo  MRN: [id number]  Today's Date: 3/4/2024  Time Calculation  Start Time: 0759  Stop Time: 0829  Time Calculation (min): 30 min    Insurance:  Visit number: 5 of 12    Assessment:  PT Assessment  Assessment Comment: pt continues to progress with resistance and reps, he is improving with AROM but still has pain in the high end ranges of shoulder flexion/abduction    Plan:  Recheck in 4-5 weeks when pt returns from business trip         General  Chief Complaint:   1. Left shoulder pain, unspecified chronicity  Follow Up In Physical Therapy          Subjective:     Current Problem  General  Reason for Referral: L shoulder pain  Referred By: Miguel Wilcox Comment: pt states no change in his shoulder and is doing well with exercises         Performing HEP?: Yes    Pain  Pain Assessment: 0-10  Pain Score: 0 - No pain    Objective:     Treatments:   This therapist instructed and demonstrated interventions to patient, patient completed the following under direct supervision of this therapist:  Therapeutic Exercise:   min  30 MINUTES  Therapeutic Exercise  Therapeutic Exercise Activity 1: Scifit UE only x5 mins  Therapeutic Exercise Activity 2: L shoudler IR double green tube 2x30  Therapeutic Exercise Activity 3: b/l shoulder IR with dowel 2x20 (behind back)  Therapeutic Exercise Activity 4: LUE chest fly double green tube 2x20  Therapeutic Exercise Activity 5: L shoulder abduction to ~90 degrees 2# 2x15-20  Therapeutic Exercise Activity 6: L shoulder ISO holds with walk outs triple green tube 3x~6-7  Therapeutic Exercise Activity 7: ball circles CW/CCW with 2kg weighted ball x2  Therapeutic Exercise Activity 8: ball circles red ball (no weight) at wall and in abduction CW/CCW  Therapeutic Exercise Activity 9: supine serratus punches 3# 2x30  Therapeutic Exercise Activity 10: sidelying L shoulder IR 3# 2x30      Active       PT Problem        demo HEP w/ at least 75% accuracy in order to increase strength and flexibility  (Progressing)       Start:  12/20/23    Expected End:  03/12/24            Pt will improve QuickDASH score by 10% to demonstrate in order to show increased functional mobility  (Progressing)       Start:  12/20/23    Expected End:  03/12/24            demonstrate 2/3 a muscle grade strength increase in LUE in order to complete his home exercise routine   (Progressing)       Start:  12/20/23    Expected End:  03/12/24            increased ROM of L shoulder to full range degrees in order to improve reaching overhead without pain  (Progressing)       Start:  12/20/23    Expected End:  03/12/24            report GROC +3 or greater (MCID) in order to show self perceived improvement with therapy  (Progressing)       Start:  12/20/23    Expected End:  03/12/24                Education:       Kyle Batres, PT

## 2024-03-04 NOTE — PROGRESS NOTES
Subjective   Patient ID: Justus Arredondo is a 53 y.o. male who presents for virtual visit.    HPI   Patient is here with complaints of cough for 10 days   sore throat dry cough.  Over-the-counter medications not helping      patient is here for follow-up on MRI of the left shoulder  He is dealing with this pain for last 1 year since injury  Pain has improved but recently he was doing more workout in the gym and for past 1 month the pain has got worse.  It hurts to move the arm and the back and legs above the head     Past recap   for physical   Patient still having some limitation in range of motion on the left shoulder.  He never went for MRI  He is considering having a steroid injection   Patient also has a lot of difficulty sleeping.  Work stress and family stress causes interference with the sleep.  He keeps prescription of Ambien for very rare use   He is using mouthguard to help with the sleep apnea and doing much better with it   He went to see the dermatologist got skin tags removed and irritated keratosis treated with steroid cream    patient presents with complaints of having left shoulder pain in December he fell on the left side in the bathroom.  He did therapy felt better but now having issues again.  He started going to the gym and doing workouts  Wants skin tags removed from the armpits  Patient feels very tired and fatigued concern if he has low testosterone      for physical  Patient has a sedentary job  He does weightlifting 3 times a week exercising more  eating better  lost weight     Past medical history: High cholesterol, elevated liver function, hypertension  Social history: Nonsmoker, drinks 2 drinks per week  Family history: Grandmother diabetes uncle CVA, pGM CVA , M DM      Review of Systems    Objective   There were no vitals taken for this visit.        Assessment/Plan   Problem List Items Addressed This Visit    None  Visit Diagnoses       Acute cough        Relevant Medications     benzonatate (Tessalon) 200 mg capsule        Past recap   physical normal  EKG normal sinus rhythm  Blood work reviewed  Kidney functions are doing better  Liver functions are going up again  Cholesterol is coming down but HDL is going up , TG normal  Continue diet and exercise  Patient does not want to go on medication  Advised to increase activity  Discussed dietary changes  Follow-up in 1 year  Screening colonoscopy    5/24/2023  Left shoulder has slightly restricted range of motion  Worried about it is a capsulitis  MRI of the shoulder ordered  Refer to dermatology for skin tag  Testosterone levels ordered to rule out cause for fatigue    11/29/2023  Physical normal  Patient had EKG done last year normal   Patient had colonoscopy came out good  Testosterone levels came out normal  Blood work reviewed liver enzymes elevated again  Lifestyle modification discussed  Ambien 7 tablets given  Refer patient to physical therapy  Advised to do MRI before going for steroid injection as injections are temporary fix  Encourage him to increase hydration    1/3/2024  MRI of left shoulder results reviewed  Concern for adhesive capsulitis  Moderate supraspinatus tendinitis with low-grade partial intrasubstance tear  Patient should try physical therapy as it does not look like surgical case  If that does not help steroid injection will help  Refer patient to physical therapy  Refer patient to orthopedic  Will give with a course of anti-inflammatory  Follow-up if not    2/19/2024  Treat with Zithromax 500 daily for 10 days  Tessalon Perles as needed  Follow-up if not better

## 2024-03-08 ENCOUNTER — APPOINTMENT (OUTPATIENT)
Dept: ORTHOPEDIC SURGERY | Facility: CLINIC | Age: 54
End: 2024-03-08
Payer: COMMERCIAL

## 2024-03-11 ENCOUNTER — APPOINTMENT (OUTPATIENT)
Dept: PHYSICAL THERAPY | Facility: CLINIC | Age: 54
End: 2024-03-11
Payer: COMMERCIAL

## 2024-03-18 ENCOUNTER — APPOINTMENT (OUTPATIENT)
Dept: PHYSICAL THERAPY | Facility: CLINIC | Age: 54
End: 2024-03-18
Payer: COMMERCIAL

## 2024-04-09 ENCOUNTER — APPOINTMENT (OUTPATIENT)
Dept: PHYSICAL THERAPY | Facility: CLINIC | Age: 54
End: 2024-04-09
Payer: COMMERCIAL

## 2024-04-26 ENCOUNTER — APPOINTMENT (OUTPATIENT)
Dept: PHYSICAL THERAPY | Facility: CLINIC | Age: 54
End: 2024-04-26
Payer: COMMERCIAL

## 2024-05-03 ENCOUNTER — TREATMENT (OUTPATIENT)
Dept: PHYSICAL THERAPY | Facility: CLINIC | Age: 54
End: 2024-05-03
Payer: COMMERCIAL

## 2024-05-03 DIAGNOSIS — M25.512 ACUTE PAIN OF LEFT SHOULDER: Primary | ICD-10-CM

## 2024-05-03 DIAGNOSIS — M25.512 LEFT SHOULDER PAIN, UNSPECIFIED CHRONICITY: ICD-10-CM

## 2024-05-03 PROCEDURE — 97110 THERAPEUTIC EXERCISES: CPT | Mod: GP

## 2024-05-03 PROCEDURE — 97530 THERAPEUTIC ACTIVITIES: CPT | Mod: GP

## 2024-05-03 ASSESSMENT — PAIN - FUNCTIONAL ASSESSMENT: PAIN_FUNCTIONAL_ASSESSMENT: 0-10

## 2024-05-03 ASSESSMENT — PAIN SCALES - GENERAL: PAINLEVEL_OUTOF10: 0 - NO PAIN

## 2024-05-03 NOTE — PROGRESS NOTES
Physical Therapy  Physical Therapy Discharge Summary    Patient Name: Justus Arredondo  MRN: [id number]  Today's Date: 5/3/2024  Time Calculation  Start Time: 801  Stop Time: 830  Time Calculation (min): 29 min    Name: Justus Arredondo  MRN: [id number]  : 1970  Date: 24    Discharge Summary: PT    Discharge Information: Date of discharge 5/3/2024, Date of last visit 5/3/2024, Date of evaluation 2024, Number of attended visits 6, Referred by Miguel, and Referred for L shoulder pain    Therapy Summary: pt was seen periodically to check progress and achieved full ROM of his left shoulder with only mild discomfort through abduction.      Discharge Status: improve functional mobility of LUE     Rehab Discharge Reason: Achieved all and/or the most significant goals(s)    Pt completed treatment with moderate effort this date    Plan:  DC PT       General  Chief Complaint:   1. Acute pain of left shoulder            Subjective:     Pt reports his shoulder is feeling better he has started back at the gym but still has mild irritation in the L shoulder with loading, he reports he is sleeping better    Current Problem  General  Reason for Referral: L shoulder pain  Referred By: Miguel  General Comment: Visit 6    Precautions  Precautions Comment: none    Performing HEP?: Yes    Pain  Pain Assessment: 0-10  Pain Score: 0 - No pain    Objective:   Shoulder Functional Rating Scale  Quick Dash: 11% self reported disability    Cervical AROM  Cervical AROM WFL: yes    Shoulder AROM    L Shoulder flexion: (180°): 180  L Shoulder Extension: (60°): 60  L Shoulder abduction: (180°): 180  L shoulder ER: (90°): T2  L shoulder IR: (70°): T12    Shoulder Strength  L shoulder flexion: (5/5): 4+/5* pain  L shoulder abduction: (5/5): 4+/5  L shoulder ER: (5/5): 4+/5  L shoulder IR: (5/5): 4+/5* mild pain    Shoulder Special Tests  Painful arc: Negative: pos  Infraspinatus MMT: Negative: neg  Neer: (Negative):  neg  Painful arc: (Negative): pos  Shoulder Instability: neg  UE Special Tests Comments: L IR lift off neg    GROC +5     Treatments:   This therapist instructed and demonstrated interventions to patient, patient completed the following under direct supervision of this therapist:  Therapeutic Exercise:   min  13 MINUTES  Therapeutic Exercise  Therapeutic Exercise Activity 1: AAROM IR with dowel x30  Therapeutic Exercise Activity 2: LUE mid row  Therapeutic Exercise Activity 3: LUE chest fly  Therapeutic Exercise Activity 4: 90/90 shoulder IR  Therapeutic Exercise Activity 5: LAE LUE  Therapeutic Activity:      16 MINUTES  Therapeutic Activity  Therapeutic Activity 1: recheck for AROM and goals      Resolved       PT Problem       demo HEP w/ at least 75% accuracy in order to increase strength and flexibility  (Met)       Start:  12/20/23    Expected End:  03/12/24    Resolved:  05/03/24         Pt will improve QuickDASH score by 10% to demonstrate in order to show increased functional mobility  (Met)       Start:  12/20/23    Expected End:  03/12/24    Resolved:  05/03/24         demonstrate 2/3 a muscle grade strength increase in LUE in order to complete his home exercise routine   (Met)       Start:  12/20/23    Expected End:  03/12/24    Resolved:  05/03/24         increased ROM of L shoulder to full range degrees in order to improve reaching overhead without pain  (Met)       Start:  12/20/23    Expected End:  03/12/24    Resolved:  05/03/24         report GROC +3 or greater (MCID) in order to show self perceived improvement with therapy  (Met)       Start:  12/20/23    Expected End:  03/12/24    Resolved:  05/03/24             Education:   Discussed muscle/tendon loading program, continuing with HEP at gym, planes of motion for further challenging of UE    Kyle Batres, PT

## 2024-05-13 ENCOUNTER — APPOINTMENT (OUTPATIENT)
Dept: PHYSICAL THERAPY | Facility: CLINIC | Age: 54
End: 2024-05-13
Payer: COMMERCIAL

## 2024-07-12 ENCOUNTER — OFFICE VISIT (OUTPATIENT)
Dept: PRIMARY CARE | Facility: CLINIC | Age: 54
End: 2024-07-12
Payer: COMMERCIAL

## 2024-07-12 VITALS
SYSTOLIC BLOOD PRESSURE: 116 MMHG | DIASTOLIC BLOOD PRESSURE: 56 MMHG | HEIGHT: 73 IN | BODY MASS INDEX: 29.82 KG/M2 | WEIGHT: 225 LBS

## 2024-07-12 DIAGNOSIS — R05.1 ACUTE COUGH: Primary | ICD-10-CM

## 2024-07-12 PROCEDURE — 3008F BODY MASS INDEX DOCD: CPT | Performed by: INTERNAL MEDICINE

## 2024-07-12 PROCEDURE — 99213 OFFICE O/P EST LOW 20 MIN: CPT | Performed by: INTERNAL MEDICINE

## 2024-07-12 RX ORDER — PANTOPRAZOLE SODIUM 40 MG/1
40 TABLET, DELAYED RELEASE ORAL 2 TIMES DAILY
Qty: 60 TABLET | Refills: 1 | Status: SHIPPED | OUTPATIENT
Start: 2024-07-12 | End: 2025-01-08

## 2024-07-12 ASSESSMENT — ENCOUNTER SYMPTOMS
DEPRESSION: 0
LOSS OF SENSATION IN FEET: 0
OCCASIONAL FEELINGS OF UNSTEADINESS: 0

## 2024-07-20 NOTE — PROGRESS NOTES
"Subjective   Patient ID: Justus Arredondo is a 54 y.o. male who presents for Cough.    HPI   Patient is here complaining of cough for past 3 months.  Dry hacking cough worse at night  Kids had COVID pains got COVID but everybody improved  He is not feeling sick    Past recap  Patient is here for follow-up on MRI of the left shoulder  He is dealing with this pain for last 1 year since injury  Pain has improved but recently he was doing more workout in the gym and for past 1 month the pain has got worse.  It hurts to move the arm and the back and legs above the head     Past recap   for physical   Patient still having some limitation in range of motion on the left shoulder.  He never went for MRI  He is considering having a steroid injection   Patient also has a lot of difficulty sleeping.  Work stress and family stress causes interference with the sleep.  He keeps prescription of Ambien for very rare use   He is using mouthguard to help with the sleep apnea and doing much better with it   He went to see the dermatologist got skin tags removed and irritated keratosis treated with steroid cream    patient presents with complaints of having left shoulder pain in December he fell on the left side in the bathroom.  He did therapy felt better but now having issues again.  He started going to the gym and doing workouts  Wants skin tags removed from the armpits  Patient feels very tired and fatigued concern if he has low testosterone      for physical  Patient has a sedentary job  He does weightlifting 3 times a week exercising more  eating better  lost weight     Past medical history: High cholesterol, elevated liver function, hypertension  Social history: Nonsmoker, drinks 2 drinks per week  Family history: Grandmother diabetes uncle CVA, pGM CVA , M DM      Review of Systems    Objective   /56   Ht 1.854 m (6' 1\")   Wt 102 kg (225 lb)   BMI 29.69 kg/m²     Physical Exam  Vitals reviewed.   Constitutional:  "      Appearance: Normal appearance.   HENT:      Head: Normocephalic and atraumatic.      Right Ear: Tympanic membrane, ear canal and external ear normal.      Left Ear: Tympanic membrane, ear canal and external ear normal.      Nose: Nose normal.      Mouth/Throat:      Pharynx: Oropharynx is clear.   Eyes:      Extraocular Movements: Extraocular movements intact.      Conjunctiva/sclera: Conjunctivae normal.      Pupils: Pupils are equal, round, and reactive to light.   Cardiovascular:      Rate and Rhythm: Normal rate and regular rhythm.      Pulses: Normal pulses.      Heart sounds: Normal heart sounds.   Pulmonary:      Effort: Pulmonary effort is normal.      Breath sounds: Normal breath sounds.   Abdominal:      General: Abdomen is flat. Bowel sounds are normal.      Palpations: Abdomen is soft.   Musculoskeletal:      Cervical back: Normal range of motion and neck supple.   Skin:     General: Skin is warm and dry.   Neurological:      General: No focal deficit present.      Mental Status: He is alert and oriented to person, place, and time.   Psychiatric:         Mood and Affect: Mood normal.         Assessment/Plan   Problem List Items Addressed This Visit    None  Visit Diagnoses       Acute cough    -  Primary    Relevant Medications    pantoprazole (ProtoNix) 40 mg EC tablet        Past recap   physical normal  EKG normal sinus rhythm  Blood work reviewed  Kidney functions are doing better  Liver functions are going up again  Cholesterol is coming down but HDL is going up , TG normal  Continue diet and exercise  Patient does not want to go on medication  Advised to increase activity  Discussed dietary changes  Follow-up in 1 year  Screening colonoscopy    5/24/2023  Left shoulder has slightly restricted range of motion  Worried about it is a capsulitis  MRI of the shoulder ordered  Refer to dermatology for skin tag  Testosterone levels ordered to rule out cause for fatigue    11/29/2023  Physical  normal  Patient had EKG done last year normal   Patient had colonoscopy came out good  Testosterone levels came out normal  Blood work reviewed liver enzymes elevated again  Lifestyle modification discussed  Ambien 7 tablets given  Refer patient to physical therapy  Advised to do MRI before going for steroid injection as injections are temporary fix  Encourage him to increase hydration    1/3/2024  MRI of left shoulder results reviewed  Concern for adhesive capsulitis  Moderate supraspinatus tendinitis with low-grade partial intrasubstance tear  Patient should try physical therapy as it does not look like surgical case  If that does not help steroid injection will help  Refer patient to physical therapy  Refer patient to orthopedic  Will give with a course of anti-inflammatory  Follow-up if not    7/12/2024  Cough most likely from acid reflux  Examinations unremarkable  Treat with PPI  Patient is except medication 1 more acid reflux  If not better will do imaging studies  Lifestyle modification

## 2024-08-06 DIAGNOSIS — Z00.00 HEALTHCARE MAINTENANCE: ICD-10-CM

## 2024-08-06 DIAGNOSIS — Z12.5 ENCOUNTER FOR PROSTATE CANCER SCREENING: ICD-10-CM

## 2024-09-17 ENCOUNTER — APPOINTMENT (OUTPATIENT)
Dept: PRIMARY CARE | Facility: CLINIC | Age: 54
End: 2024-09-17
Payer: COMMERCIAL

## 2024-09-25 ENCOUNTER — LAB (OUTPATIENT)
Dept: LAB | Facility: LAB | Age: 54
End: 2024-09-25
Payer: COMMERCIAL

## 2024-09-25 DIAGNOSIS — Z00.00 HEALTHCARE MAINTENANCE: ICD-10-CM

## 2024-09-25 DIAGNOSIS — Z12.5 ENCOUNTER FOR PROSTATE CANCER SCREENING: ICD-10-CM

## 2024-09-25 LAB
ALBUMIN SERPL BCP-MCNC: 4.4 G/DL (ref 3.4–5)
ALP SERPL-CCNC: 70 U/L (ref 33–120)
ALT SERPL W P-5'-P-CCNC: 42 U/L (ref 10–52)
ANION GAP SERPL CALC-SCNC: 14 MMOL/L (ref 10–20)
AST SERPL W P-5'-P-CCNC: 25 U/L (ref 9–39)
BILIRUB SERPL-MCNC: 1.1 MG/DL (ref 0–1.2)
BUN SERPL-MCNC: 13 MG/DL (ref 6–23)
CALCIUM SERPL-MCNC: 9 MG/DL (ref 8.6–10.3)
CHLORIDE SERPL-SCNC: 104 MMOL/L (ref 98–107)
CHOLEST SERPL-MCNC: 209 MG/DL (ref 0–199)
CHOLESTEROL/HDL RATIO: 6.7
CO2 SERPL-SCNC: 25 MMOL/L (ref 21–32)
CREAT SERPL-MCNC: 1.24 MG/DL (ref 0.5–1.3)
EGFRCR SERPLBLD CKD-EPI 2021: 70 ML/MIN/1.73M*2
ERYTHROCYTE [DISTWIDTH] IN BLOOD BY AUTOMATED COUNT: 12.6 % (ref 11.5–14.5)
GLUCOSE SERPL-MCNC: 96 MG/DL (ref 74–99)
HCT VFR BLD AUTO: 46.2 % (ref 41–52)
HDLC SERPL-MCNC: 31.2 MG/DL
HGB BLD-MCNC: 15.3 G/DL (ref 13.5–17.5)
LDLC SERPL CALC-MCNC: 139 MG/DL
MCH RBC QN AUTO: 28.8 PG (ref 26–34)
MCHC RBC AUTO-ENTMCNC: 33.1 G/DL (ref 32–36)
MCV RBC AUTO: 87 FL (ref 80–100)
NON HDL CHOLESTEROL: 178 MG/DL (ref 0–149)
NRBC BLD-RTO: 0 /100 WBCS (ref 0–0)
PLATELET # BLD AUTO: 187 X10*3/UL (ref 150–450)
POTASSIUM SERPL-SCNC: 4.1 MMOL/L (ref 3.5–5.3)
PROT SERPL-MCNC: 6.6 G/DL (ref 6.4–8.2)
PSA SERPL-MCNC: 0.44 NG/ML
RBC # BLD AUTO: 5.32 X10*6/UL (ref 4.5–5.9)
SODIUM SERPL-SCNC: 139 MMOL/L (ref 136–145)
TRIGL SERPL-MCNC: 192 MG/DL (ref 0–149)
TSH SERPL-ACNC: 1.87 MIU/L (ref 0.44–3.98)
VLDL: 38 MG/DL (ref 0–40)
WBC # BLD AUTO: 6.6 X10*3/UL (ref 4.4–11.3)

## 2024-09-25 PROCEDURE — 84153 ASSAY OF PSA TOTAL: CPT

## 2024-09-25 PROCEDURE — 36415 COLL VENOUS BLD VENIPUNCTURE: CPT

## 2024-09-25 PROCEDURE — 84443 ASSAY THYROID STIM HORMONE: CPT

## 2024-09-25 PROCEDURE — 80053 COMPREHEN METABOLIC PANEL: CPT

## 2024-09-25 PROCEDURE — 80061 LIPID PANEL: CPT

## 2024-09-25 PROCEDURE — 85027 COMPLETE CBC AUTOMATED: CPT

## 2024-09-26 ENCOUNTER — APPOINTMENT (OUTPATIENT)
Dept: PRIMARY CARE | Facility: CLINIC | Age: 54
End: 2024-09-26
Payer: COMMERCIAL

## 2024-09-26 VITALS
WEIGHT: 225 LBS | SYSTOLIC BLOOD PRESSURE: 116 MMHG | DIASTOLIC BLOOD PRESSURE: 64 MMHG | BODY MASS INDEX: 29.82 KG/M2 | HEIGHT: 73 IN

## 2024-09-26 DIAGNOSIS — Z23 ENCOUNTER FOR IMMUNIZATION: ICD-10-CM

## 2024-09-26 DIAGNOSIS — Z00.00 PHYSICAL EXAM: ICD-10-CM

## 2024-09-26 PROCEDURE — 93000 ELECTROCARDIOGRAM COMPLETE: CPT | Performed by: INTERNAL MEDICINE

## 2024-09-26 PROCEDURE — 90471 IMMUNIZATION ADMIN: CPT | Performed by: INTERNAL MEDICINE

## 2024-09-26 PROCEDURE — 3008F BODY MASS INDEX DOCD: CPT | Performed by: INTERNAL MEDICINE

## 2024-09-26 PROCEDURE — 90656 IIV3 VACC NO PRSV 0.5 ML IM: CPT | Performed by: INTERNAL MEDICINE

## 2024-09-26 PROCEDURE — 99396 PREV VISIT EST AGE 40-64: CPT | Performed by: INTERNAL MEDICINE

## 2024-09-26 NOTE — PROGRESS NOTES
"Subjective   Patient ID: Justus Arredondo is a 54 y.o. male who presents for cpe.    HPI   Patient is here for physical  Doing regular exercise with a  and feeling much better    Past recap  Patient is here complaining of cough for past 3 months.  Dry hacking cough worse at night  Kids had COVID pains got COVID but everybody improved  He is not feeling sick    Patient is here for follow-up on MRI of the left shoulder  He is dealing with this pain for last 1 year since injury  Pain has improved but recently he was doing more workout in the gym and for past 1 month the pain has got worse.  It hurts to move the arm and the back and legs above the head     Past recap   for physical   Patient still having some limitation in range of motion on the left shoulder.  He never went for MRI  He is considering having a steroid injection   Patient also has a lot of difficulty sleeping.  Work stress and family stress causes interference with the sleep.  He keeps prescription of Ambien for very rare use   He is using mouthguard to help with the sleep apnea and doing much better with it   He went to see the dermatologist got skin tags removed and irritated keratosis treated with steroid cream    patient presents with complaints of having left shoulder pain in December he fell on the left side in the bathroom.  He did therapy felt better but now having issues again.  He started going to the gym and doing workouts  Wants skin tags removed from the armpits  Patient feels very tired and fatigued concern if he has low testosterone      for physical  Patient has a sedentary job  He does weightlifting 3 times a week exercising more  eating better  lost weight     Past medical history: High cholesterol, elevated liver function, hypertension  Social history: Nonsmoker, drinks 2 drinks per week  Family history: Grandmother diabetes uncle CVA, pGM CVA , M DM      Review of Systems    Objective   /64   Ht 1.854 m (6' 1\")   " Wt 102 kg (225 lb)   BMI 29.69 kg/m²     Physical Exam  Vitals reviewed.   Constitutional:       Appearance: Normal appearance.   HENT:      Head: Normocephalic and atraumatic.      Right Ear: Tympanic membrane, ear canal and external ear normal.      Left Ear: Tympanic membrane, ear canal and external ear normal.      Nose: Nose normal.      Mouth/Throat:      Pharynx: Oropharynx is clear.   Eyes:      Extraocular Movements: Extraocular movements intact.      Conjunctiva/sclera: Conjunctivae normal.      Pupils: Pupils are equal, round, and reactive to light.   Cardiovascular:      Rate and Rhythm: Normal rate and regular rhythm.      Pulses: Normal pulses.      Heart sounds: Normal heart sounds.   Pulmonary:      Effort: Pulmonary effort is normal.      Breath sounds: Normal breath sounds.   Abdominal:      General: Abdomen is flat. Bowel sounds are normal.      Palpations: Abdomen is soft.   Musculoskeletal:      Cervical back: Normal range of motion and neck supple.   Skin:     General: Skin is warm and dry.   Neurological:      General: No focal deficit present.      Mental Status: He is alert and oriented to person, place, and time.   Psychiatric:         Mood and Affect: Mood normal.         Assessment/Plan   Problem List Items Addressed This Visit    None  Visit Diagnoses       Encounter for immunization        Relevant Orders    Flu vaccine, trivalent, preservative free, age 6 months and greater (Fluraix/Fluzone/Flulaval) (Completed)    Physical exam        Relevant Orders    ECG 12 Lead    Lipid Panel    Comprehensive Metabolic Panel        Past recap   physical normal  EKG normal sinus rhythm  Blood work reviewed  Kidney functions are doing better  Liver functions are going up again  Cholesterol is coming down but HDL is going up , TG normal  Continue diet and exercise  Patient does not want to go on medication  Advised to increase activity  Discussed dietary changes  Follow-up in 1 year  Screening  colonoscopy    5/24/2023  Left shoulder has slightly restricted range of motion  Worried about it is a capsulitis  MRI of the shoulder ordered  Refer to dermatology for skin tag  Testosterone levels ordered to rule out cause for fatigue    11/29/2023  Physical normal  Patient had EKG done last year normal   Patient had colonoscopy came out good  Testosterone levels came out normal  Blood work reviewed liver enzymes elevated again  Lifestyle modification discussed  Ambien 7 tablets given  Refer patient to physical therapy  Advised to do MRI before going for steroid injection as injections are temporary fix  Encourage him to increase hydration    1/3/2024  MRI of left shoulder results reviewed  Concern for adhesive capsulitis  Moderate supraspinatus tendinitis with low-grade partial intrasubstance tear  Patient should try physical therapy as it does not look like surgical case  If that does not help steroid injection will help  Refer patient to physical therapy  Refer patient to orthopedic  Will give with a course of anti-inflammatory  Follow-up if not    7/12/2024  Cough most likely from acid reflux  Examinations unremarkable  Treat with PPI  Patient is except medication 1 more acid reflux  If not better will do imaging studies  Lifestyle modification    9/26/2024  Physical normal  EKG done shows normal sinus rhythm  Acid reflux is doing better  Cough is doing better  Patient still appears to be at risk for sleep apnea  Snoring is getting better since he is losing weight  Discussed maybe using oral appliances to see if it helps  Blood work reviewed  Cholesterol has gone little bit higher  Discussed diet and exercise again  Discussed medications with patient does not want to  Follow-up blood work in 6 months

## 2024-10-15 DIAGNOSIS — E78.2 MIXED HYPERLIPIDEMIA: ICD-10-CM

## 2024-10-15 RX ORDER — ROSUVASTATIN CALCIUM 5 MG/1
5 TABLET, COATED ORAL DAILY
Qty: 90 TABLET | Refills: 1 | Status: SHIPPED | OUTPATIENT
Start: 2024-10-15 | End: 2025-11-19

## 2025-04-02 ENCOUNTER — OFFICE VISIT (OUTPATIENT)
Dept: URGENT CARE | Age: 55
End: 2025-04-02
Payer: COMMERCIAL

## 2025-04-02 VITALS
SYSTOLIC BLOOD PRESSURE: 106 MMHG | HEIGHT: 74 IN | DIASTOLIC BLOOD PRESSURE: 70 MMHG | BODY MASS INDEX: 26.95 KG/M2 | WEIGHT: 210 LBS | TEMPERATURE: 98.1 F | HEART RATE: 80 BPM | OXYGEN SATURATION: 94 %

## 2025-04-02 DIAGNOSIS — J02.9 VIRAL PHARYNGITIS: ICD-10-CM

## 2025-04-02 LAB
POC HUMAN RHINOVIRUS PCR: NEGATIVE
POC INFLUENZA A VIRUS PCR: NEGATIVE
POC INFLUENZA B VIRUS PCR: NEGATIVE
POC RESPIRATORY SYNCYTIAL VIRUS PCR: NEGATIVE
POC STREPTOCOCCUS PYOGENES (GROUP A STREP) PCR: NEGATIVE

## 2025-04-02 PROCEDURE — 99203 OFFICE O/P NEW LOW 30 MIN: CPT | Performed by: PHYSICIAN ASSISTANT

## 2025-04-02 PROCEDURE — 3008F BODY MASS INDEX DOCD: CPT | Performed by: PHYSICIAN ASSISTANT

## 2025-04-02 PROCEDURE — 87651 STREP A DNA AMP PROBE: CPT | Performed by: PHYSICIAN ASSISTANT

## 2025-04-02 RX ORDER — METHYLPREDNISOLONE 4 MG/1
TABLET ORAL
Qty: 21 TABLET | Refills: 0 | Status: SHIPPED | OUTPATIENT
Start: 2025-04-02 | End: 2025-04-02

## 2025-04-02 RX ORDER — METHYLPREDNISOLONE 4 MG/1
TABLET ORAL
Qty: 21 TABLET | Refills: 0 | Status: SHIPPED | OUTPATIENT
Start: 2025-04-02 | End: 2025-04-08

## 2025-04-02 ASSESSMENT — PAIN SCALES - GENERAL: PAINLEVEL_OUTOF10: 5

## 2025-04-02 NOTE — PATIENT INSTRUCTIONS
Please take Motrin or ibuprofen for additional discomfort. You can use warm tea and honey or salt water gargles to soothe your throat.

## 2025-04-02 NOTE — PROGRESS NOTES
"Subjective   Patient ID: Justus Arredondo is a 54 y.o. male. They present today with a chief complaint of Sore Throat (Sore throat x 1 day ).    Past Medical History  Allergies as of 04/02/2025    (No Known Allergies)       (Not in a hospital admission)       Past Medical History:   Diagnosis Date    Personal history of other diseases of the circulatory system 06/29/2022    History of hypertension       No past surgical history on file.     reports that he has never smoked. He has never used smokeless tobacco. He reports current alcohol use. He reports that he does not use drugs.    Review of Systems  ROS is negative unless otherwise stated in HPI.       Objective    Vitals:    04/02/25 0804   BP: 106/70   Pulse: 80   Temp: 36.7 °C (98.1 °F)   SpO2: 94%   Weight: 95.3 kg (210 lb)   Height: 1.88 m (6' 2\")     No LMP for male patient.      VS: As documented in the triage note and EMR flowsheet from this visit was reviewed  General: Well appearing. No acute distress.   Eyes:  Extraocular movements grossly intact. No scleral icterus.   Head: Atraumatic. Normocephalic.     Neck: No meningismus. No gross masses. Full movement through range of motion  ENT: Posterior oropharynx shows 2+ erythematous tonsils without exudate .  Uvula is midline without edema.  No stridor or trismus  CV: Regular rhythm. No murmurs, rubs, gallops appreciated.   Resp: Clear to auscultation bilaterally. No respiratory distress.    Skin: Warm, dry. No rashes  Neuro: CN II-VII intact. A&O x3. Speech fluent. Alert. Moving all extremities. Ambulates with normal gait  Psych: Appropriate mood and affect for situation      Point of Care Test & Imaging Results from this visit  Results for orders placed or performed in visit on 04/02/25   POCT SPOTFIRE R/ST Panel Mini w/Strep A ([facility]) manually resulted   Result Value Ref Range    POC Group A Strep, PCR Negative Negative    POC Respiratory Syncytial Virus PCR Negative Negative    POC Influenza A " Virus PCR Negative Negative    POC Influenza B Virus PCR Negative Negative    POC Human Rhinovirus PCR Negative Negative      Imaging  No results found.    Cardiology, Vascular, and Other Imaging  No other imaging results found for the past 2 days      Diagnostic study results (if any) were reviewed by Kailey Hankins PA-C.    Assessment/Plan   Allergies, medications, history, and pertinent labs/EKGs/Imaging reviewed by Kailey Hankins PA-C.     Medical Decision Making  Patient is a 54-year-old male who presents with sore throat for the past day.  On examination, patient well-appearing.  Vitals are stable.  Posterior oropharynx without any exudate or asymmetrical edema.  Spot viral testing negative.  Will proceed with treatment with Medrol Dosepak for symptomatic management.  Patient advised on Tylenol, Motrin, tea and honey and salt water gargles for additional  comfort.    Orders and Diagnoses  Diagnoses and all orders for this visit:  Viral pharyngitis  -     POCT SPOTFIRE R/ST Panel Mini w/Strep A ([facility]) manually resulted  -     methylPREDNISolone (Medrol Dospak) 4 mg tablets; Follow schedule on package instructions      Medical Admin Record      Patient disposition: Home    Electronically signed by Kailey Hankins PA-C  10:17 AM

## 2025-04-11 ENCOUNTER — OFFICE VISIT (OUTPATIENT)
Dept: URGENT CARE | Age: 55
End: 2025-04-11
Payer: COMMERCIAL

## 2025-04-11 VITALS
OXYGEN SATURATION: 97 % | DIASTOLIC BLOOD PRESSURE: 87 MMHG | HEART RATE: 70 BPM | BODY MASS INDEX: 26.96 KG/M2 | WEIGHT: 210 LBS | RESPIRATION RATE: 20 BRPM | SYSTOLIC BLOOD PRESSURE: 133 MMHG | TEMPERATURE: 97.7 F

## 2025-04-11 DIAGNOSIS — J02.9 VIRAL PHARYNGITIS: Primary | ICD-10-CM

## 2025-04-11 DIAGNOSIS — J02.9 ACUTE SORE THROAT: ICD-10-CM

## 2025-04-11 LAB — POC RAPID MONO: NEGATIVE

## 2025-04-11 RX ORDER — AMOXICILLIN 875 MG/1
875 TABLET, FILM COATED ORAL 2 TIMES DAILY
Qty: 20 TABLET | Refills: 0 | Status: SHIPPED | OUTPATIENT
Start: 2025-04-11 | End: 2025-04-21

## 2025-04-11 ASSESSMENT — ENCOUNTER SYMPTOMS: SORE THROAT: 1

## 2025-04-11 NOTE — PROGRESS NOTES
Subjective   Patient ID: Justus Arredondo is a 54 y.o. male. They present today with a chief complaint of Sore Throat (Pt c/o sore throat, fatigue, feels like a fever for 10 days, was seen here 4/2, spotfire was neg, given dose bc and did not help).    History of Present Illness  Patient is a pleasant 54-year-old male, past medical history of hyperlipidemia, presented to the clinic for chief complaint of sore throat.  Patient is reporting approximately 10-day history of persistent sore throat.  He was seen and evaluated in the clinic at onset and had negative spot fire testing including strep, influenza, RSV, and rhinovirus.  He is presenting out of concern for persistent sore throat.  States that has overall improved only slightly and is concerned that it is persisting.  He denies any dysphagia odynophagia trismus drooling or change in voice.  He does report some associated fatigue.  States he only occasionally takes ibuprofen at home as needed mainly at night.  He denies any upper respiratory congestion rhinorrhea cough or sputum production.  No chest pain or shortness of breath.  No abdominal pain, nausea, vomiting.  No numbness tingling or focal weakness.      Sore Throat         Past Medical History  Allergies as of 04/11/2025    (No Known Allergies)       (Not in a hospital admission)         Past Medical History:   Diagnosis Date    Personal history of other diseases of the circulatory system 06/29/2022    History of hypertension       No past surgical history on file.     reports that he has never smoked. He has never used smokeless tobacco. He reports current alcohol use. He reports that he does not use drugs.    Review of Systems  Review of Systems   HENT:  Positive for sore throat.                                   Objective    Vitals:    04/11/25 0813   BP: 133/87   BP Location: Left arm   Patient Position: Sitting   BP Cuff Size: Large adult   Pulse: 70   Resp: 20   Temp: 36.5 °C (97.7 °F)    TempSrc: Oral   SpO2: 97%   Weight: 95.3 kg (210 lb)     No LMP for male patient.    Physical Exam  Gen.: Vitals noted no distress afebrile. Normal phonation, no stridor, no trismus.     ENT: TMs clear bilaterally, EACs unremarkable. Mastoids nontender. Posterior oropharynx without erythema, exudate, or swelling. Uvula is in the midline and nonedematous. No Jv's Angina. No trismus, drooling, muffled voice. Maintaining secretions well    Neck: Supple. No meningismus through full range of motion. No lymphadenopathy.     Cardiac: Regular rate rhythm no murmur.     Lungs: Good aeration throughout. No adventitious breath sounds.     Abdomen: Soft nontender nonsurgical throughout. Normoactive bowel sounds.     Extremities: No peripheral edema, negative Homans bilaterally no cords.     Skin: No rash.     Neuro: No focal neurologic deficits.   Procedures    Point of Care Test & Imaging Results from this visit    Imaging  No results found.    Cardiology, Vascular, and Other Imaging  No other imaging results found for the past 2 days      Diagnostic study results (if any) were reviewed by Hugo Lemos PA-C.    Assessment/Plan   Allergies, medications, history, and pertinent labs/EKGs/Imaging reviewed by Hugo Lemos PA-C.     Medical Decision Making  Patient was seen eval in the clinic for chief complaint of sore throat x 10 days.  On exam patient is nontoxic very well-appearing respect comfortably no acute distress.  Vital signs are stable, afebrile.  Chest is clear, heart is regular, belly is diffusely soft and nontender.  ENT exam as above overall unremarkable with no signs of pharyngeal injection exudate or swelling.  Uvula is in the midline nonedematous.  He has no anterior cervical lymphadenopathy.  Rapid mono was performed in the clinic and returned negative.  I did have a very extensive conversation with the patient as far as the symptoms and I do feel he is still within the timeframe for a  improving viral pharyngitis.  Advised the patient to continue to watch weight and monitor his symptoms and I did provide a course of amoxicillin to be initiated in the next 4 to 5 days if continuing to feel no improvement.  Advised to follow-up with his primary care physician as well.  Discharged home at this time.  I reviewed my impression, plan, strict return versus report to ED precautions with the patient.  He expresses understanding and agreement plan of care.      Orders and Diagnoses  Diagnoses and all orders for this visit:  Acute sore throat  -     POCT Infectious mononucleosis antibody manually resulted        Medical Admin Record      Follow Up Instructions  No follow-ups on file.    Patient disposition: Home    Electronically signed by Hugo Lemos PA-C  8:29 AM

## 2025-05-01 ENCOUNTER — OFFICE VISIT (OUTPATIENT)
Dept: PRIMARY CARE | Facility: CLINIC | Age: 55
End: 2025-05-01
Payer: COMMERCIAL

## 2025-05-01 VITALS
WEIGHT: 221 LBS | BODY MASS INDEX: 28.36 KG/M2 | SYSTOLIC BLOOD PRESSURE: 146 MMHG | TEMPERATURE: 98.4 F | HEIGHT: 74 IN | DIASTOLIC BLOOD PRESSURE: 80 MMHG

## 2025-05-01 DIAGNOSIS — J02.9 SORE THROAT: ICD-10-CM

## 2025-05-01 DIAGNOSIS — R05.1 ACUTE COUGH: ICD-10-CM

## 2025-05-01 DIAGNOSIS — R50.9 FEVER, UNSPECIFIED FEVER CAUSE: Primary | ICD-10-CM

## 2025-05-01 PROCEDURE — 3008F BODY MASS INDEX DOCD: CPT | Performed by: INTERNAL MEDICINE

## 2025-05-01 PROCEDURE — 99213 OFFICE O/P EST LOW 20 MIN: CPT | Performed by: INTERNAL MEDICINE

## 2025-05-01 RX ORDER — AZITHROMYCIN 500 MG/1
500 TABLET, FILM COATED ORAL DAILY
Qty: 10 TABLET | Refills: 0 | Status: SHIPPED | OUTPATIENT
Start: 2025-05-01 | End: 2025-05-11

## 2025-05-03 NOTE — PROGRESS NOTES
Subjective   Patient ID: Justus Arredondo is a 54 y.o. male who presents for Illness.    HPI   Patient is here complaining of sickness for past 1 month.  He went to urgent care twice  Diagnosed with viral illness treated with Medrol Dosepak  Still feel feverish but he has no fever feel his cough phlegm sore throat symptoms are improving but not going away  The symptoms started after his trip to [facility] in [place]     Past recap   patient is here for physical  Doing regular exercise with a  and feeling much better    Past recap  Patient is here complaining of cough for past 3 months.  Dry hacking cough worse at night  Kids had COVID pains got COVID but everybody improved  He is not feeling sick    Patient is here for follow-up on MRI of the left shoulder  He is dealing with this pain for last 1 year since injury  Pain has improved but recently he was doing more workout in the gym and for past 1 month the pain has got worse.  It hurts to move the arm and the back and legs above the head     Past recap   for physical   Patient still having some limitation in range of motion on the left shoulder.  He never went for MRI  He is considering having a steroid injection   Patient also has a lot of difficulty sleeping.  Work stress and family stress causes interference with the sleep.  He keeps prescription of Ambien for very rare use   He is using mouthguard to help with the sleep apnea and doing much better with it   He went to see the dermatologist got skin tags removed and irritated keratosis treated with steroid cream    patient presents with complaints of having left shoulder pain in December he fell on the left side in the bathroom.  He did therapy felt better but now having issues again.  He started going to the gym and doing workouts  Wants skin tags removed from the armpits  Patient feels very tired and fatigued concern if he has low testosterone      for physical  Patient has a sedentary job  He does  "weightlifting 3 times a week exercising more  eating better  lost weight     Past medical history: High cholesterol, elevated liver function, hypertension  Social history: Nonsmoker, drinks 2 drinks per week  Family history: Grandmother diabetes uncle CVA, pGM CVA , M DM      Review of Systems    Objective   /80   Temp 36.9 °C (98.4 °F)   Ht 1.88 m (6' 2\")   Wt 100 kg (221 lb)   BMI 28.37 kg/m²     Physical Exam  Vitals reviewed.   Constitutional:       Appearance: Normal appearance.   HENT:      Head: Normocephalic and atraumatic.      Right Ear: Tympanic membrane, ear canal and external ear normal.      Left Ear: Tympanic membrane, ear canal and external ear normal.      Nose: Nose normal.      Mouth/Throat:      Pharynx: Oropharynx is clear.   Eyes:      Extraocular Movements: Extraocular movements intact.      Conjunctiva/sclera: Conjunctivae normal.      Pupils: Pupils are equal, round, and reactive to light.   Cardiovascular:      Rate and Rhythm: Normal rate and regular rhythm.      Pulses: Normal pulses.      Heart sounds: Normal heart sounds.   Pulmonary:      Effort: Pulmonary effort is normal.      Breath sounds: Normal breath sounds.   Abdominal:      General: Abdomen is flat. Bowel sounds are normal.      Palpations: Abdomen is soft.   Musculoskeletal:      Cervical back: Normal range of motion and neck supple.   Skin:     General: Skin is warm and dry.   Neurological:      General: No focal deficit present.      Mental Status: He is alert and oriented to person, place, and time.   Psychiatric:         Mood and Affect: Mood normal.         Assessment/Plan   Problem List Items Addressed This Visit    None  Visit Diagnoses         Fever, unspecified fever cause    -  Primary    Relevant Medications    azithromycin (Zithromax) 500 mg tablet    Other Relevant Orders    Comprehensive Metabolic Panel    Magnesium      Sore throat        Relevant Medications    azithromycin (Zithromax) 500 mg tablet "    Other Relevant Orders    CBC    Comprehensive Metabolic Panel      Acute cough        Relevant Medications    azithromycin (Zithromax) 500 mg tablet    Other Relevant Orders    XR chest 2 views        Past recap   physical normal  EKG normal sinus rhythm  Blood work reviewed  Kidney functions are doing better  Liver functions are going up again  Cholesterol is coming down but HDL is going up , TG normal  Continue diet and exercise  Patient does not want to go on medication  Advised to increase activity  Discussed dietary changes  Follow-up in 1 year  Screening colonoscopy    5/24/2023  Left shoulder has slightly restricted range of motion  Worried about it is a capsulitis  MRI of the shoulder ordered  Refer to dermatology for skin tag  Testosterone levels ordered to rule out cause for fatigue    11/29/2023  Physical normal  Patient had EKG done last year normal   Patient had colonoscopy came out good  Testosterone levels came out normal  Blood work reviewed liver enzymes elevated again  Lifestyle modification discussed  Ambien 7 tablets given  Refer patient to physical therapy  Advised to do MRI before going for steroid injection as injections are temporary fix  Encourage him to increase hydration    1/3/2024  MRI of left shoulder results reviewed  Concern for adhesive capsulitis  Moderate supraspinatus tendinitis with low-grade partial intrasubstance tear  Patient should try physical therapy as it does not look like surgical case  If that does not help steroid injection will help  Refer patient to physical therapy  Refer patient to orthopedic  Will give with a course of anti-inflammatory  Follow-up if not    7/12/2024  Cough most likely from acid reflux  Examinations unremarkable  Treat with PPI  Patient is except medication 1 more acid reflux  If not better will do imaging studies  Lifestyle modification    9/26/2024  Physical normal  EKG done shows normal sinus rhythm  Acid reflux is doing better  Cough is  doing better  Patient still appears to be at risk for sleep apnea  Snoring is getting better since he is losing weight  Discussed maybe using oral appliances to see if it helps  Blood work reviewed  Cholesterol has gone little bit higher  Discussed diet and exercise again  Discussed medications with patient does not want to  Follow-up blood work in 6 months    5/1/2025  Clinically I am not able to see anything significant  Will do chest x-ray  Give a course of Zithromax  If not better get the x-ray done and blood work

## 2025-05-20 LAB
ALBUMIN SERPL-MCNC: 4.5 G/DL (ref 3.6–5.1)
ALP SERPL-CCNC: 72 U/L (ref 35–144)
ALT SERPL-CCNC: 35 U/L (ref 9–46)
ANION GAP SERPL CALCULATED.4IONS-SCNC: 8 MMOL/L (CALC) (ref 7–17)
AST SERPL-CCNC: 26 U/L (ref 10–35)
BILIRUB SERPL-MCNC: 0.7 MG/DL (ref 0.2–1.2)
BUN SERPL-MCNC: 10 MG/DL (ref 7–25)
CALCIUM SERPL-MCNC: 9.4 MG/DL (ref 8.6–10.3)
CHLORIDE SERPL-SCNC: 106 MMOL/L (ref 98–110)
CO2 SERPL-SCNC: 24 MMOL/L (ref 20–32)
CREAT SERPL-MCNC: 1.16 MG/DL (ref 0.7–1.3)
EGFRCR SERPLBLD CKD-EPI 2021: 76 ML/MIN/1.73M2
ERYTHROCYTE [DISTWIDTH] IN BLOOD BY AUTOMATED COUNT: 13 % (ref 11–15)
GLUCOSE SERPL-MCNC: 120 MG/DL (ref 65–99)
HCT VFR BLD AUTO: 47 % (ref 38.5–50)
HGB BLD-MCNC: 15.3 G/DL (ref 13.2–17.1)
MAGNESIUM SERPL-MCNC: 2.4 MG/DL (ref 1.5–2.5)
MCH RBC QN AUTO: 28.7 PG (ref 27–33)
MCHC RBC AUTO-ENTMCNC: 32.6 G/DL (ref 32–36)
MCV RBC AUTO: 88 FL (ref 80–100)
PLATELET # BLD AUTO: 211 THOUSAND/UL (ref 140–400)
PMV BLD REES-ECKER: 11.3 FL (ref 7.5–12.5)
POTASSIUM SERPL-SCNC: 4.2 MMOL/L (ref 3.5–5.3)
PROT SERPL-MCNC: 7 G/DL (ref 6.1–8.1)
RBC # BLD AUTO: 5.34 MILLION/UL (ref 4.2–5.8)
SODIUM SERPL-SCNC: 138 MMOL/L (ref 135–146)
WBC # BLD AUTO: 6.2 THOUSAND/UL (ref 3.8–10.8)

## 2025-09-25 ENCOUNTER — APPOINTMENT (OUTPATIENT)
Dept: PRIMARY CARE | Facility: CLINIC | Age: 55
End: 2025-09-25
Payer: COMMERCIAL